# Patient Record
Sex: FEMALE | Race: WHITE | NOT HISPANIC OR LATINO | Employment: PART TIME | URBAN - METROPOLITAN AREA
[De-identification: names, ages, dates, MRNs, and addresses within clinical notes are randomized per-mention and may not be internally consistent; named-entity substitution may affect disease eponyms.]

---

## 2023-03-21 ENCOUNTER — OFFICE VISIT (OUTPATIENT)
Dept: FAMILY MEDICINE CLINIC | Facility: CLINIC | Age: 63
End: 2023-03-21

## 2023-03-21 VITALS
HEIGHT: 62 IN | WEIGHT: 213 LBS | SYSTOLIC BLOOD PRESSURE: 140 MMHG | TEMPERATURE: 97.8 F | DIASTOLIC BLOOD PRESSURE: 80 MMHG | BODY MASS INDEX: 39.2 KG/M2 | HEART RATE: 85 BPM | OXYGEN SATURATION: 97 %

## 2023-03-21 DIAGNOSIS — Z00.00 ANNUAL PHYSICAL EXAM: Primary | ICD-10-CM

## 2023-03-21 DIAGNOSIS — Z12.11 COLON CANCER SCREENING: ICD-10-CM

## 2023-03-21 DIAGNOSIS — Z13.220 LIPID SCREENING: ICD-10-CM

## 2023-03-21 PROBLEM — Q27.9 VENOUS ANOMALY: Status: ACTIVE | Noted: 2023-03-21

## 2023-03-21 NOTE — PROGRESS NOTES
Assessment/Plan:       1  Annual physical exam  Assessment & Plan:  I have reviewed the nurse practitioner's note and agree with the workup and plan  Check labs    Orders:  -     CBC and differential; Future  -     Comprehensive metabolic panel; Future    2  Colon cancer screening  Comments:  Fit test ordered  Orders:  -     Occult Blood, Fecal Immunochemical; Future    3  Lipid screening  -     CBC and differential; Future  -     Comprehensive metabolic panel; Future  -     Lipid panel; Future        Subjective:      Patient ID: Sarina Dozier is a 65 y o  female  [facility]  No [facility]  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  No chest pain or sob  Sh eunderstands the importance of weight loss/diet/exercise  She needs labs  She is on no routine meds  She does not smoke or drink  Sh ehad a mammogram   She opts for a FIT test       The following portions of the patient's history were reviewed and updated as appropriate: allergies, current medications, past family history, past medical history, past social history, past surgical history, and problem list     Review of Systems   Respiratory: Negative  Cardiovascular: Negative  Gastrointestinal: Negative  Objective:      /80   Pulse 85   Temp 97 8 °F (36 6 °C)   Ht 5' 2" (1 575 m)   Wt 96 6 kg (213 lb)   SpO2 97%   BMI 38 96 kg/m²          Physical Exam  Vitals and nursing note reviewed  Constitutional:       Appearance: Normal appearance  HENT:      Head: Normocephalic and atraumatic  Nose: Nose normal       Mouth/Throat:      Mouth: Mucous membranes are moist    Eyes:      Extraocular Movements: Extraocular movements intact  Pupils: Pupils are equal, round, and reactive to light  Cardiovascular:      Rate and Rhythm: Normal rate and regular rhythm  Pulses: Normal pulses  Pulmonary:      Effort: Pulmonary effort is normal       Breath sounds: Normal breath sounds     Abdominal: General: Bowel sounds are normal       Palpations: Abdomen is soft  Musculoskeletal:      Cervical back: Normal range of motion  Skin:     General: Skin is warm and dry  Capillary Refill: Capillary refill takes less than 2 seconds  Neurological:      General: No focal deficit present  Mental Status: She is alert     Psychiatric:         Mood and Affect: Mood normal

## 2023-05-22 ENCOUNTER — TELEPHONE (OUTPATIENT)
Dept: FAMILY MEDICINE CLINIC | Facility: CLINIC | Age: 63
End: 2023-05-22

## 2023-05-22 NOTE — LETTER
May 23, 2023     Patient: Skip Bronson  YOB: 1960  Date of Visit: 5/22/2023      To Whom it May Concern:    Skip Bronson is under my professional care  Wei Coker was seen in my office on 5/22/2023  Wei Coker may return to work on 5/29/23  please excuse 5/22-5/26    If you have any questions or concerns, please don't hesitate to call           Sincerely,          Boubacar Powell MD        CC: No Recipients

## 2023-05-22 NOTE — TELEPHONE ENCOUNTER
Patient called she is requesting a Dr  Note for this week of work, she as given a work note back in 2019 for the same reason  And she is asking if it is possible for you to write another one for this week   Please advise

## 2023-05-22 NOTE — LETTER
May 23, 2023     Patient: Maria Esther Michelle  YOB: 1960  Date of Visit: 5/22/2023      To Whom it May Concern:    Maria Esther Michelle is under my professional care  Pineda Loera was seen in my office on 5/22/2023  Pineda Loera may return to work on 5/29/23  please excuse 5/22-5/26    If you have any questions or concerns, please don't hesitate to call           Sincerely,          Yfn Campbell [place]        CC: No Recipients

## 2023-05-23 NOTE — TELEPHONE ENCOUNTER
Patient's employer called regarding the letter  It is incorrect as it states that patient can be dismissed from gym class and sports  Patient is also asking for the DATE OF VISIT to be dated 05/22/2023  It needs to be redone and then signed and faxed to [phone]

## 2024-03-27 ENCOUNTER — OFFICE VISIT (OUTPATIENT)
Dept: FAMILY MEDICINE CLINIC | Facility: CLINIC | Age: 64
End: 2024-03-27
Payer: COMMERCIAL

## 2024-03-27 VITALS
OXYGEN SATURATION: 98 % | HEIGHT: 62 IN | HEART RATE: 97 BPM | DIASTOLIC BLOOD PRESSURE: 82 MMHG | WEIGHT: 196.4 LBS | SYSTOLIC BLOOD PRESSURE: 122 MMHG | BODY MASS INDEX: 36.14 KG/M2

## 2024-03-27 DIAGNOSIS — Z12.4 SCREENING FOR CERVICAL CANCER: ICD-10-CM

## 2024-03-27 DIAGNOSIS — Z00.00 ANNUAL PHYSICAL EXAM: Primary | ICD-10-CM

## 2024-03-27 DIAGNOSIS — Q27.9 VENOUS ANOMALY: ICD-10-CM

## 2024-03-27 PROCEDURE — 99396 PREV VISIT EST AGE 40-64: CPT | Performed by: FAMILY MEDICINE

## 2024-03-27 NOTE — PROGRESS NOTES
"Assessment/Plan:       1. Annual physical exam  Assessment & Plan:  Reviewed age-appropriate health maintenance and preventive care.      Orders:  -     CBC and Platelet; Future  -     Comprehensive metabolic panel; Future  -     Lipid Panel With Direct LDL; Future  -     Occult Blood, Fecal Immunochemical; Future  -     CBC and Platelet  -     Comprehensive metabolic panel  -     Lipid Panel With Direct LDL    2. Screening for cervical cancer  -     Occult Blood, Fecal Immunochemical; Future    3. Venous anomaly          Subjective:      Patient ID: Evelina Ford is a 63 y.o. female.    Ranjana is doing great.  No complaints/concerns.  No chest pain or sob.  No falls or depression.  Labs ordered.  Colon screening ordered.  She has lost weight.  Discussed importance of diet/exercise/weight loss.        The following portions of the patient's history were reviewed and updated as appropriate: allergies, current medications, past family history, past medical history, past social history, past surgical history, and problem list.    Review of Systems   Respiratory: Negative.     Cardiovascular: Negative.          Objective:      /82   Pulse 97   Ht 5' 2\" (1.575 m)   Wt 89.1 kg (196 lb 6.4 oz)   SpO2 98%   BMI 35.92 kg/m²          Physical Exam  Vitals and nursing note reviewed.   Constitutional:       Appearance: Normal appearance.   HENT:      Head: Normocephalic and atraumatic.      Nose: Nose normal.      Mouth/Throat:      Mouth: Mucous membranes are moist.   Eyes:      Extraocular Movements: Extraocular movements intact.      Pupils: Pupils are equal, round, and reactive to light.   Cardiovascular:      Rate and Rhythm: Normal rate and regular rhythm.      Pulses: Normal pulses.   Pulmonary:      Effort: Pulmonary effort is normal.      Breath sounds: Normal breath sounds.   Abdominal:      General: Bowel sounds are normal.      Palpations: Abdomen is soft.   Musculoskeletal:      Cervical back: Normal range " of motion.   Skin:     General: Skin is warm and dry.      Capillary Refill: Capillary refill takes less than 2 seconds.   Neurological:      General: No focal deficit present.      Mental Status: She is alert.   Psychiatric:         Mood and Affect: Mood normal.

## 2024-05-17 LAB
ALBUMIN SERPL-MCNC: 4.2 G/DL (ref 3.5–5.7)
ALP SERPL-CCNC: 71 U/L (ref 35–120)
ALT SERPL-CCNC: 12 U/L
ANION GAP SERPL CALCULATED.3IONS-SCNC: 9 MMOL/L (ref 3–11)
AST SERPL-CCNC: 11 U/L
BASOPHILS # BLD AUTO: 0 THOU/CMM (ref 0–0.1)
BASOPHILS NFR BLD AUTO: 1 %
BILIRUB SERPL-MCNC: 0.5 MG/DL (ref 0.2–1)
BUN SERPL-MCNC: 14 MG/DL (ref 7–25)
CALCIUM SERPL-MCNC: 9.2 MG/DL (ref 8.5–10.1)
CHLORIDE SERPL-SCNC: 103 MMOL/L (ref 100–109)
CHOLEST SERPL-MCNC: 254 MG/DL
CHOLEST/HDLC SERPL: 4.2 {RATIO}
CO2 SERPL-SCNC: 29 MMOL/L (ref 21–31)
CREAT SERPL-MCNC: 0.82 MG/DL (ref 0.4–1.1)
CYTOLOGY CMNT CVX/VAG CYTO-IMP: ABNORMAL
DIFFERENTIAL METHOD BLD: NORMAL
EOSINOPHIL # BLD AUTO: 0.1 THOU/CMM (ref 0–0.5)
EOSINOPHIL NFR BLD AUTO: 2 %
ERYTHROCYTE [DISTWIDTH] IN BLOOD BY AUTOMATED COUNT: 13.8 % (ref 12–16)
GFR/BSA.PRED SERPLBLD CYS-BASED-ARV: 81 ML/MIN/{1.73_M2}
GLUCOSE SERPL-MCNC: 131 MG/DL (ref 65–99)
HCT VFR BLD AUTO: 38.5 % (ref 35–43)
HDLC SERPL-MCNC: 61 MG/DL (ref 23–92)
HGB BLD-MCNC: 12.7 G/DL (ref 11.5–14.5)
LDLC SERPL CALC-MCNC: 154 MG/DL
LDLC SERPL DIRECT ASSAY-MCNC: 171 MG/DL
LYMPHOCYTES # BLD AUTO: 2.1 THOU/CMM (ref 1–3)
LYMPHOCYTES NFR BLD AUTO: 34 %
MCH RBC QN AUTO: 29.3 PG (ref 26–34)
MCHC RBC AUTO-ENTMCNC: 33.1 G/DL (ref 32–37)
MCV RBC AUTO: 89 FL (ref 80–100)
MONOCYTES # BLD AUTO: 0.5 THOU/CMM (ref 0.3–1)
MONOCYTES NFR BLD AUTO: 8 %
NEUTROPHILS # BLD AUTO: 3.4 THOU/CMM (ref 1.8–7.8)
NEUTROPHILS NFR BLD AUTO: 55 %
NONHDLC SERPL-MCNC: 193 MG/DL
PLATELET # BLD AUTO: 242 THOU/CMM (ref 140–350)
PMV BLD REES-ECKER: 8.2 FL (ref 7.5–11.3)
POTASSIUM SERPL-SCNC: 4 MMOL/L (ref 3.5–5.2)
PROT SERPL-MCNC: 6.8 G/DL (ref 6.3–8.3)
RBC # BLD AUTO: 4.34 MILL/CMM (ref 3.7–4.7)
SODIUM SERPL-SCNC: 141 MMOL/L (ref 135–145)
TRIGL SERPL-MCNC: 196 MG/DL
WBC # BLD AUTO: 6.1 THOU/CMM (ref 4–10)

## 2024-05-20 ENCOUNTER — TELEPHONE (OUTPATIENT)
Dept: FAMILY MEDICINE CLINIC | Facility: CLINIC | Age: 64
End: 2024-05-20

## 2024-05-20 ENCOUNTER — DOCUMENTATION (OUTPATIENT)
Dept: FAMILY MEDICINE CLINIC | Facility: CLINIC | Age: 64
End: 2024-05-20

## 2024-05-20 DIAGNOSIS — R73.01 IMPAIRED FASTING GLUCOSE: Primary | ICD-10-CM

## 2024-05-20 LAB — HEMOCCULT STL QL IA: NEGATIVE

## 2024-05-20 NOTE — TELEPHONE ENCOUNTER
----- Message from Robert Budinetz, MD sent at 5/19/2024  6:59 AM EDT -----  Cholesterol elevated  Glucose slightly elevated. A1c ordered  Diet/exercise/weight loss

## 2024-05-22 ENCOUNTER — TELEPHONE (OUTPATIENT)
Dept: FAMILY MEDICINE CLINIC | Facility: CLINIC | Age: 64
End: 2024-05-22

## 2024-05-22 NOTE — TELEPHONE ENCOUNTER
----- Message from Robert Budinetz, MD sent at 5/20/2024  7:59 PM EDT -----  Stool negative for blood

## 2025-04-09 ENCOUNTER — OFFICE VISIT (OUTPATIENT)
Dept: OBGYN CLINIC | Facility: CLINIC | Age: 65
End: 2025-04-09
Payer: COMMERCIAL

## 2025-04-09 VITALS
HEIGHT: 62 IN | WEIGHT: 198.4 LBS | BODY MASS INDEX: 36.51 KG/M2 | DIASTOLIC BLOOD PRESSURE: 80 MMHG | SYSTOLIC BLOOD PRESSURE: 130 MMHG

## 2025-04-09 DIAGNOSIS — N95.0 POSTMENOPAUSAL BLEEDING: Primary | ICD-10-CM

## 2025-04-09 PROCEDURE — 99203 OFFICE O/P NEW LOW 30 MIN: CPT | Performed by: PHYSICIAN ASSISTANT

## 2025-04-09 NOTE — PROGRESS NOTES
ASSESSMENT/PLAN:    Encounter Diagnosis     ICD-10-CM    1. Postmenopausal bleeding  N95.0 US pelvis complete w transvaginal        - Reviewed concerns regarding postmenopausal bleeding with patient including concern and need to r/o uterine cancer.   - Offered patient uterine biopsy today, but pt declines exam and biopsy today  - TVUS ordered and discussed this process with patient, will evaluate uterine lining, lining should be thin in postmenopausal patients  - Discussed that regardless of US findings, would recommend biopsy of uterus for patient given the chronic nature of her bleeding as it has continued daily for 3 months. Patient desires to proceed with biopsy/D&C in the operating room as she does not want to do a procedure in the office  - Will reach out to patient with US results.       SUBJECTIVE/HPI:      Patient ID: Evelina Ford 1960       Evelina Ford is a 64 y.o.  presenting to the office as a new patient with postmenopausal bleeding x 3 months. Patient had pink spotting that started 3 months ago. It was initially when wiping, but now is a light streak on her pad daily. She denies heavy bleeding. She has been postmenopausal for approx 10 years. She is not diabetic. She has a hx of 3 vaginal deliveries.       HISTORY:    Patient Active Problem List   Diagnosis    Venous anomaly       Allergies   Allergen Reactions    Aspirin Other (See Comments)     Avoids ASA due to venous anomaly       No current outpatient medications on file.    OB History          3    Para   3    Term   3            AB        Living   3         SAB        IAB        Ectopic        Multiple        Live Births   3           Obstetric Comments   Menarche 17                Past Medical History:   Diagnosis Date    No pertinent past medical history         Past Surgical History:   Procedure Laterality Date    HERNIA REPAIR      TUBAL LIGATION          Family History   Problem Relation Age of Onset    Diabetes  "Mother     Breast cancer Neg Hx     Colon cancer Neg Hx     Ovarian cancer Neg Hx         REVIEW OF SYSTEMS:  Review of Systems   Genitourinary:  Positive for vaginal bleeding. Negative for pelvic pain and vaginal pain.        OBJECTIVE:    Visit Vitals  /80 (BP Location: Left arm, Patient Position: Sitting, Cuff Size: Standard)   Ht 5' 2\" (1.575 m)   Wt 90 kg (198 lb 6.4 oz)   BMI 36.29 kg/m²   OB Status Postmenopausal   Smoking Status Never   BSA 1.91 m²         Physical Exam  Constitutional:       General: She is not in acute distress.     Appearance: Normal appearance. She is obese. She is not ill-appearing, toxic-appearing or diaphoretic.   HENT:      Head: Normocephalic and atraumatic.   Pulmonary:      Effort: Pulmonary effort is normal.   Neurological:      General: No focal deficit present.      Mental Status: She is alert.   Skin:     General: Skin is warm and dry.   Psychiatric:         Mood and Affect: Mood normal.         Behavior: Behavior normal.   Vitals reviewed.         "

## 2025-04-16 ENCOUNTER — OFFICE VISIT (OUTPATIENT)
Dept: FAMILY MEDICINE CLINIC | Facility: CLINIC | Age: 65
End: 2025-04-16
Payer: COMMERCIAL

## 2025-04-16 ENCOUNTER — APPOINTMENT (OUTPATIENT)
Dept: LAB | Facility: CLINIC | Age: 65
End: 2025-04-16

## 2025-04-16 VITALS
HEIGHT: 61 IN | BODY MASS INDEX: 36.63 KG/M2 | HEART RATE: 94 BPM | WEIGHT: 194 LBS | TEMPERATURE: 97.8 F | SYSTOLIC BLOOD PRESSURE: 118 MMHG | DIASTOLIC BLOOD PRESSURE: 74 MMHG | OXYGEN SATURATION: 99 %

## 2025-04-16 DIAGNOSIS — Z12.11 COLON CANCER SCREENING: ICD-10-CM

## 2025-04-16 DIAGNOSIS — Z12.4 SCREENING FOR CERVICAL CANCER: ICD-10-CM

## 2025-04-16 DIAGNOSIS — Q27.9 VENOUS ANOMALY: ICD-10-CM

## 2025-04-16 DIAGNOSIS — Z12.31 ENCOUNTER FOR SCREENING MAMMOGRAM FOR BREAST CANCER: ICD-10-CM

## 2025-04-16 DIAGNOSIS — Z00.00 ANNUAL PHYSICAL EXAM: Primary | ICD-10-CM

## 2025-04-16 PROCEDURE — 99396 PREV VISIT EST AGE 40-64: CPT | Performed by: FAMILY MEDICINE

## 2025-04-16 NOTE — PATIENT INSTRUCTIONS
"Patient Education     Routine physical for adults   The Basics   Written by the doctors and editors at [facility]   What is a physical? -- A physical is a routine visit, or \"check-up,\" with your doctor. You might also hear it called a \"wellness visit\" or \"preventive visit.\"  During each visit, the doctor will:   Ask about your physical and mental health   Ask about your habits, behaviors, and lifestyle   Do an exam   Give you vaccines if needed   Talk to you about any medicines you take   Give advice about your health   Answer your questions  Getting regular check-ups is an important part of taking care of your health. It can help your doctor find and treat any problems you have. But it's also important for preventing health problems.  A routine physical is different from a \"sick visit.\" A sick visit is when you see a doctor because of a health concern or problem. Since physicals are scheduled ahead of time, you can think about what you want to ask the doctor.  How often should I get a physical? -- It depends on your age and health. In general, for people age 21 years and older:   If you are younger than 50 years, you might be able to get a physical every 3 years.   If you are 50 years or older, your doctor might recommend a physical every year.  If you have an ongoing health condition, like diabetes or high blood pressure, your doctor will probably want to see you more often.  What happens during a physical? -- In general, each visit will include:   Physical exam - The doctor or nurse will check your height, weight, heart rate, and blood pressure. They will also look at your eyes and ears. They will ask about how you are feeling and whether you have any symptoms that bother you.   Medicines - It's a good idea to bring a list of all the medicines you take to each doctor visit. Your doctor will talk to you about your medicines and answer any questions. Tell them if you are having any side effects that bother you. You " "should also tell them if you are having trouble paying for any of your medicines.   Habits and behaviors - This includes:   Your diet   Your exercise habits   Whether you smoke, drink alcohol, or use drugs   Whether you are sexually active   Whether you feel safe at home  Your doctor will talk to you about things you can do to improve your health and lower your risk of health problems. They will also offer help and support. For example, if you want to quit smoking, they can give you advice and might prescribe medicines. If you want to improve your diet or get more physical activity, they can help you with this, too.   Lab tests, if needed - The tests you get will depend on your age and situation. For example, your doctor might want to check your:   Cholesterol   Blood sugar   Iron level   Vaccines - The recommended vaccines will depend on your age, health, and what vaccines you already had. Vaccines are very important because they can prevent certain serious or deadly infections.   Discussion of screening - \"Screening\" means checking for diseases or other health problems before they cause symptoms. Your doctor can recommend screening based on your age, risk, and preferences. This might include tests to check for:   Cancer, such as breast, prostate, cervical, ovarian, colorectal, prostate, lung, or skin cancer   Sexually transmitted infections, such as chlamydia and gonorrhea   Mental health conditions like depression and anxiety  Your doctor will talk to you about the different types of screening tests. They can help you decide which screenings to have. They can also explain what the results might mean.   Answering questions - The physical is a good time to ask the doctor or nurse questions about your health. If needed, they can refer you to other doctors or specialists, too.  Adults older than 65 years often need other care, too. As you get older, your doctor will talk to you about:   How to prevent falling at " home   Hearing or vision tests   Memory testing   How to take your medicines safely   Making sure that you have the help and support you need at home  All topics are updated as new evidence becomes available and our peer review process is complete.  This topic retrieved from [employer] on: May 02, 2024.  Topic 517877 Version 1.0  Release: 32.4.3 - C32.122  © 2024 UpToDate, Inc. and/or its affiliates. All rights reserved.  Consumer Information Use and Disclaimer   Disclaimer: This generalized information is a limited summary of diagnosis, treatment, and/or medication information. It is not meant to be comprehensive and should be used as a tool to help the user understand and/or assess potential diagnostic and treatment options. It does NOT include all information about conditions, treatments, medications, side effects, or risks that may apply to a specific patient. It is not intended to be medical advice or a substitute for the medical advice, diagnosis, or treatment of a health care provider based on the health care provider's examination and assessment of a patient's specific and unique circumstances. Patients must speak with a health care provider for complete information about their health, medical questions, and treatment options, including any risks or benefits regarding use of medications. This information does not endorse any treatments or medications as safe, effective, or approved for treating a specific patient. UpToDate, Inc. and its affiliates disclaim any warranty or liability relating to this information or the use thereof.The use of this information is governed by the Terms of Use, available at https://www.woltersexcentosuwer.com/en/know/clinical-effectiveness-terms. 2024© UpToDate, Inc. and its affiliates and/or licensors. All rights reserved.  Copyright   © 2024 UpToDate, Inc. and/or its affiliates. All rights reserved.

## 2025-04-16 NOTE — PROGRESS NOTES
Adult Annual Physical  Name: Evelina Ford      : 1960      MRN: [id number]  Encounter Provider: Robert Budinetz, MD  Encounter Date: 2025   Encounter department: [facility]    :  Assessment & Plan  Annual physical exam  Reviewed age-appropriate health maintenance and preventive care.         Encounter for screening mammogram for breast cancer         Screening for cervical cancer         Colon cancer screening    Orders:  •  iFOBT/FIT; Future    Venous anomaly             Preventive Screenings:  - Diabetes Screening: screening up-to-date  - Breast cancer screening: screening up-to-date   - Colon cancer screening: screening up-to-date   - Lung cancer screening: screening not indicated     Immunizations:  - Immunizations due: Influenza and Zoster (Shingrix)      Depression Screening and Follow-up Plan: Patient was screened for depression during today's encounter. They screened negative with a PHQ-2 score of 0.        History of Present Illness     Adult Annual Physical:  Patient presents for annual physical. Ranjana is doing well.  Labs ordered.  She agrees to a FIT colon screen.  Discussed diet/exercise/weight loss.  Mammogram was wnl.  No meds.  BP is ok.  She has a pelvic u/s scheduled.  She does not drink/smoke..     Diet and Physical Activity:  - Diet/Nutrition: no special diet.  - Exercise: no formal exercise.    Depression Screening:  - PHQ-2 Score: 0    General Health:  - Sleep: sleeps well.  - Hearing: normal hearing bilateral ears.  - Vision: no vision problems, most recent eye exam > 1 year ago and wears glasses.  - Dental: regular dental visits and brushes teeth once daily.    /GYN Health:    - Menopause: postmenopausal.   - History of STDs: no  - Contraception: menopause.      Advanced Care Planning:  - Has an advanced directive?: yes    - Has a durable medical POA?: yes      Review of Systems   Respiratory: Negative.     Cardiovascular: Negative.          Objective   BP  "118/74   Pulse 94   Temp 97.8 °F (36.6 °C) (Temporal)   Ht 5' 1\" (1.549 m)   Wt 88 kg (194 lb)   SpO2 99%   BMI 36.66 kg/m²     Physical Exam  Vitals and nursing note reviewed.   Constitutional:       General: She is not in acute distress.     Appearance: She is well-developed.   HENT:      Head: Normocephalic and atraumatic.   Eyes:      Conjunctiva/sclera: Conjunctivae normal.   Cardiovascular:      Rate and Rhythm: Normal rate and regular rhythm.      Heart sounds: No murmur heard.  Pulmonary:      Effort: Pulmonary effort is normal. No respiratory distress.      Breath sounds: Normal breath sounds.   Abdominal:      Palpations: Abdomen is soft.      Tenderness: There is no abdominal tenderness.   Musculoskeletal:         General: No swelling.      Cervical back: Neck supple.   Skin:     General: Skin is warm and dry.      Capillary Refill: Capillary refill takes less than 2 seconds.   Neurological:      Mental Status: She is alert.   Psychiatric:         Mood and Affect: Mood normal.       "

## 2025-04-22 ENCOUNTER — HOSPITAL ENCOUNTER (OUTPATIENT)
Dept: ULTRASOUND IMAGING | Facility: HOSPITAL | Age: 65
Discharge: HOME | End: 2025-04-22
Attending: PHYSICIAN ASSISTANT
Payer: COMMERCIAL

## 2025-04-22 ENCOUNTER — RESULTS FOLLOW-UP (OUTPATIENT)
Dept: FAMILY MEDICINE CLINIC | Facility: CLINIC | Age: 65
End: 2025-04-22

## 2025-04-22 ENCOUNTER — RESULTS FOLLOW-UP (OUTPATIENT)
Dept: OBGYN CLINIC | Facility: CLINIC | Age: 65
End: 2025-04-22

## 2025-04-22 ENCOUNTER — APPOINTMENT (OUTPATIENT)
Dept: LAB | Facility: CLINIC | Age: 65
End: 2025-04-22
Payer: COMMERCIAL

## 2025-04-22 DIAGNOSIS — N95.0 POSTMENOPAUSAL BLEEDING: ICD-10-CM

## 2025-04-22 DIAGNOSIS — Z12.11 COLON CANCER SCREENING: ICD-10-CM

## 2025-04-22 LAB — HEMOCCULT STL QL IA: NEGATIVE

## 2025-04-22 PROCEDURE — 76856 US EXAM PELVIC COMPLETE: CPT

## 2025-04-22 PROCEDURE — G0328 FECAL BLOOD SCRN IMMUNOASSAY: HCPCS

## 2025-04-22 PROCEDURE — 76830 TRANSVAGINAL US NON-OB: CPT

## 2025-05-06 ENCOUNTER — TELEPHONE (OUTPATIENT)
Dept: OBGYN CLINIC | Facility: CLINIC | Age: 65
End: 2025-05-06

## 2025-05-06 ENCOUNTER — TELEPHONE (OUTPATIENT)
Age: 65
End: 2025-05-06

## 2025-05-06 ENCOUNTER — PREP FOR PROCEDURE (OUTPATIENT)
Dept: OBGYN CLINIC | Facility: CLINIC | Age: 65
End: 2025-05-06

## 2025-05-06 DIAGNOSIS — N95.0 PMB (POSTMENOPAUSAL BLEEDING): Primary | ICD-10-CM

## 2025-05-06 DIAGNOSIS — R93.89 THICKENED ENDOMETRIUM: ICD-10-CM

## 2025-05-06 RX ORDER — SODIUM CHLORIDE, SODIUM LACTATE, POTASSIUM CHLORIDE, CALCIUM CHLORIDE 600; 310; 30; 20 MG/100ML; MG/100ML; MG/100ML; MG/100ML
20 INJECTION, SOLUTION INTRAVENOUS CONTINUOUS
OUTPATIENT
Start: 2025-05-06

## 2025-05-06 NOTE — TELEPHONE ENCOUNTER
----- Message sent at 2025  1:36 PM EDT -----  Regarding: needs surgery per Amber    .[facility] GYN Department  Surgery Scheduling Sheet    Patient Name: Evelina Ford  : 1960    Provider: Dr. Ana Florez / Amber Montez PA-C     Needed: no; Language: N/A    Procedure: exam under anesthesia and dilation and curettage, hysteroscopy    Diagnosis: PMB, thickened endometrium    Special Needs or Equipment: symphion    Anesthesia: IV sedation with anesthesia    Length of stay: outpatient  Does patient have comorbid conditions that will require close perioperative monitoring prior to safe discharge: no    -The patient has comorbid conditions that will require close perioperative monitoring prior to safe discharge, including N/A.   -This may require acute care beyond the usual and routine recovery period. As such, inpatient admission post-operatively is expected and appropriate, and anticipated hospital length of stay will be >2 midnights.    Pre-Admission Testing Needed: no   Labs that should be ordered: NA    Order PAT that is recommended in prep for procedure?: Not Indicated    Medical Clearance Needed: no; Provider: N/A    MA Form Signed (tubals/hysterectomy): Not Indicated    Surgical Drink Given: no     How many days out of work: 1 day(s)     How many days no drivin day(s)       Is pre op appt needed?  yes  Interval for post op appt: 2 week(s)       For Surgical Scheduler:     Surgery Scheduled On: MON. 25-MORNING  [place]: [facility]    Pre-op Appt: 25  Post op Appt: 25  Consult/Medical clearance appt: N/A

## 2025-05-06 NOTE — TELEPHONE ENCOUNTER
.Called and spoke with pt.   Surgery is now scheduled.   Please see the [facility] OB GYN Department Surgery Scheduling Sheet in this encounter for further information.

## 2025-05-06 NOTE — TELEPHONE ENCOUNTER
Patient called to follow-up on surgery scheduling for D&C for PMB. Attempted to warm transfer to [facility], unavailable.     Please contact pt

## 2025-05-19 ENCOUNTER — ANESTHESIA EVENT (OUTPATIENT)
Dept: PERIOP | Facility: AMBULARY SURGERY CENTER | Age: 65
End: 2025-05-19
Payer: COMMERCIAL

## 2025-05-20 ENCOUNTER — CONSULT (OUTPATIENT)
Dept: OBGYN CLINIC | Facility: CLINIC | Age: 65
End: 2025-05-20
Payer: COMMERCIAL

## 2025-05-20 VITALS
DIASTOLIC BLOOD PRESSURE: 86 MMHG | SYSTOLIC BLOOD PRESSURE: 138 MMHG | BODY MASS INDEX: 36.06 KG/M2 | HEIGHT: 61 IN | WEIGHT: 191 LBS

## 2025-05-20 DIAGNOSIS — Z01.818 PRE-OP EXAMINATION: Primary | ICD-10-CM

## 2025-05-20 DIAGNOSIS — N95.0 PMB (POSTMENOPAUSAL BLEEDING): ICD-10-CM

## 2025-05-20 PROCEDURE — 99214 OFFICE O/P EST MOD 30 MIN: CPT | Performed by: OBSTETRICS & GYNECOLOGY

## 2025-05-20 NOTE — PROGRESS NOTES
Assessment Evelina was seen today for pre-op exam.    Diagnoses and all orders for this visit:    Pre-op examination    PMB (postmenopausal bleeding)         Plan    Evelina Ford is scheduled for exam under anesthesia, dilation and curettage , hysteroscopy, and diagnostic hysteroscopy & PAP on 2025. Pre-op instructions, including showering with Hibiclens, discussed with patient and patient received paper copy.     The risks, benefits and alternatives to the procedure were discussed.  We discussed the risks of pain, bleeding, blood clot, infection, allergic reaction, neurovascular injury, injury to uterus, injury to surrounding structures such as bowel, bladder and/or ureters, and possibility of inability to complete the procedure.  We discussed code status - full code.  Blood transfusion is acceptable.  We discussed resident physician participation in the procedure, including pelvic exam.  All questions answered, consent obtained.      Subjective  Chief Complaint   Patient presents with    Pre-op Exam     Pt is here for her pre op d+c           Evelina Ford is a 64 y.o.  female  here for a pre-op consultation. She started having discharge - there was light pink discharge, late February while she was in [place] x 1 episode. She's been having yellow discharge since then, and then had red blood yesterday, small amount on pad, then faint on wiping. No cramping.     We discussed evaluation with D&C, hysteroscopy, resection of uterine pathology and pap smear in OR. Patient is very concerned about modesty in the OR and wants to be covered up as much as possible.       Problem List[1]      Gynecologic History  No LMP recorded. Patient is postmenopausal.  H/o normal paps   Last mammogram: 2025. Results were: Cat 1     Obstetric History  OB History    Para Term  AB Living   3 3 3   3   SAB IAB Ectopic Multiple Live Births       3      # Outcome Date GA Lbr Darrell/2nd Weight Sex Type Anes PTL Lv   3  Term      Vag-Spont   TOR   2 Term      Vag-Spont   TOR   1 Term      Vag-Spont   TOR      Obstetric Comments   Menarche 17        Past Medical/Surgical/Family/Social History    Past Medical History:   Diagnosis Date    No pertinent past medical history      Past Surgical History:   Procedure Laterality Date    HERNIA REPAIR  1992    TUBAL LIGATION  1992     Family History   Problem Relation Age of Onset    Diabetes Mother     Breast cancer Neg Hx     Colon cancer Neg Hx     Ovarian cancer Neg Hx      Social History     Socioeconomic History    Marital status: /Civil Union     Spouse name: Not on file    Number of children: Not on file    Years of education: Not on file    Highest education level: Not on file   Occupational History    Not on file   Tobacco Use    Smoking status: Never    Smokeless tobacco: Never   Vaping Use    Vaping status: Never Used   Substance and Sexual Activity    Alcohol use: Never     Comment: rare    Drug use: Never     Comment: Denies    Sexual activity: Not Currently     Birth control/protection: Post-menopausal     Comment: Declined   Other Topics Concern    Not on file   Social History Narrative    Not on file     Social Drivers of Health     Financial Resource Strain: Not on file   Food Insecurity: Patient Declined (4/11/2025)    Nursing - Inadequate Food Risk Classification     Worried About Running Out of Food in the Last Year: Patient declined     Ran Out of Food in the Last Year: Patient declined     Ran Out of Food in the Last Year: Not on file   Transportation Needs: Patient Declined (4/11/2025)    PRAPARE - Transportation     Lack of Transportation (Medical): Patient declined     Lack of Transportation (Non-Medical): Patient declined   Physical Activity: Not on file   Stress: Not on file   Social Connections: Not on file   Intimate Partner Violence: Not on file   Housing Stability: Patient Declined (4/11/2025)    Housing Stability Vital Sign     Unable to Pay for Housing  "in the Last Year: Patient declined     Number of Times Moved in the Last Year: Not on file     Homeless in the Last Year: Patient declined         Aspirin  Current Medications[2]      Review of Systems  Review of Systems   Constitutional:  Negative for chills and fever.   Gastrointestinal:  Negative for blood in stool and constipation.   Genitourinary:  Positive for vaginal bleeding. Negative for dysuria, frequency and pelvic pain.              Objective     /86 (BP Location: Right arm, Patient Position: Sitting, Cuff Size: Large)   Ht 5' 1\" (1.549 m)   Wt 86.6 kg (191 lb)   BMI 36.09 kg/m²     Physical Exam  Constitutional:       General: She is not in acute distress.     Appearance: Normal appearance. She is well-developed. She is not ill-appearing.     Cardiovascular:      Rate and Rhythm: Normal rate and regular rhythm.   Pulmonary:      Effort: Pulmonary effort is normal. No respiratory distress.      Breath sounds: Normal breath sounds.   Abdominal:      General: Abdomen is flat. There is no distension.      Palpations: Abdomen is soft.      Tenderness: There is no abdominal tenderness. There is no guarding or rebound.     Musculoskeletal:      Cervical back: Normal range of motion.      Right lower leg: No edema.      Left lower leg: No edema.     Neurological:      General: No focal deficit present.      Mental Status: She is alert and oriented to person, place, and time.     Skin:     General: Skin is warm and dry.     Psychiatric:         Mood and Affect: Mood normal.         Behavior: Behavior normal.         Thought Content: Thought content normal.         Judgment: Judgment normal.   Vitals and nursing note reviewed.         US pelvis complete w transvaginal  Narrative: PELVIC ULTRASOUND, COMPLETE    INDICATION: The patient is 64 years old. N95.0: Postmenopausal bleeding.    COMPARISON: None    TECHNIQUE: Transabdominal pelvic ultrasound was performed in sagittal and transverse planes with a " curvilinear transducer. Additional transvaginal imaging was performed to better evaluate the endometrium and ovaries. Imaging included volumetric sweeps as   well as traditional still imaging technique.    FINDINGS:    UTERUS:  The uterus is anteverted in position, measuring 7.9 x 4.1 x 4.7 cm.  The uterus has a normal contour and echotexture.  The cervix appears within normal limits.    ENDOMETRIUM:  The endometrial echo complex has an AP caliber of 19.0 mm.  Endometrial echo complex appears abnormally thickened for patient age, and heterogeneous.    OVARIES/ADNEXA:  Right ovary: x x cm. mL.  Ovarian Doppler flow is within normal limits.  No suspicious ovarian or adnexal abnormality.    Left ovary: 1.9 x 1.5 x 1.1 cm. 1.6 mL.  Ovarian Doppler flow is within normal limits.  No suspicious ovarian or adnexal abnormality.    OTHER:  No free fluid or loculated fluid collections.  Impression: Thickened heterogeneous endometrium measuring 19 mm in this postmenopausal patient. Recommend endometrial biopsy    Workstation performed: [id number]                           [1]   Patient Active Problem List  Diagnosis    Venous anomaly   [2] No current outpatient medications on file.

## 2025-05-20 NOTE — H&P (VIEW-ONLY)
Assessment Evelina was seen today for pre-op exam.    Diagnoses and all orders for this visit:    Pre-op examination    PMB (postmenopausal bleeding)         Plan    Evelina Ford is scheduled for exam under anesthesia, dilation and curettage , hysteroscopy, and diagnostic hysteroscopy & PAP on 2025. Pre-op instructions, including showering with Hibiclens, discussed with patient and patient received paper copy.     The risks, benefits and alternatives to the procedure were discussed.  We discussed the risks of pain, bleeding, blood clot, infection, allergic reaction, neurovascular injury, injury to uterus, injury to surrounding structures such as bowel, bladder and/or ureters, and possibility of inability to complete the procedure.  We discussed code status - full code.  Blood transfusion is acceptable.  We discussed resident physician participation in the procedure, including pelvic exam.  All questions answered, consent obtained.      Subjective  Chief Complaint   Patient presents with    Pre-op Exam     Pt is here for her pre op d+c           Evelina Ford is a 64 y.o.  female  here for a pre-op consultation. She started having discharge - there was light pink discharge, late February while she was in [place] x 1 episode. She's been having yellow discharge since then, and then had red blood yesterday, small amount on pad, then faint on wiping. No cramping.     We discussed evaluation with D&C, hysteroscopy, resection of uterine pathology and pap smear in OR. Patient is very concerned about modesty in the OR and wants to be covered up as much as possible.       Problem List[1]      Gynecologic History  No LMP recorded. Patient is postmenopausal.  H/o normal paps   Last mammogram: 2025. Results were: Cat 1     Obstetric History  OB History    Para Term  AB Living   3 3 3   3   SAB IAB Ectopic Multiple Live Births       3      # Outcome Date GA Lbr Darrell/2nd Weight Sex Type Anes PTL Lv   3  Term      Vag-Spont   TOR   2 Term      Vag-Spont   TOR   1 Term      Vag-Spont   TOR      Obstetric Comments   Menarche 17        Past Medical/Surgical/Family/Social History    Past Medical History:   Diagnosis Date    No pertinent past medical history      Past Surgical History:   Procedure Laterality Date    HERNIA REPAIR  1992    TUBAL LIGATION  1992     Family History   Problem Relation Age of Onset    Diabetes Mother     Breast cancer Neg Hx     Colon cancer Neg Hx     Ovarian cancer Neg Hx      Social History     Socioeconomic History    Marital status: /Civil Union     Spouse name: Not on file    Number of children: Not on file    Years of education: Not on file    Highest education level: Not on file   Occupational History    Not on file   Tobacco Use    Smoking status: Never    Smokeless tobacco: Never   Vaping Use    Vaping status: Never Used   Substance and Sexual Activity    Alcohol use: Never     Comment: rare    Drug use: Never     Comment: Denies    Sexual activity: Not Currently     Birth control/protection: Post-menopausal     Comment: Declined   Other Topics Concern    Not on file   Social History Narrative    Not on file     Social Drivers of Health     Financial Resource Strain: Not on file   Food Insecurity: Patient Declined (4/11/2025)    Nursing - Inadequate Food Risk Classification     Worried About Running Out of Food in the Last Year: Patient declined     Ran Out of Food in the Last Year: Patient declined     Ran Out of Food in the Last Year: Not on file   Transportation Needs: Patient Declined (4/11/2025)    PRAPARE - Transportation     Lack of Transportation (Medical): Patient declined     Lack of Transportation (Non-Medical): Patient declined   Physical Activity: Not on file   Stress: Not on file   Social Connections: Not on file   Intimate Partner Violence: Not on file   Housing Stability: Patient Declined (4/11/2025)    Housing Stability Vital Sign     Unable to Pay for Housing  "in the Last Year: Patient declined     Number of Times Moved in the Last Year: Not on file     Homeless in the Last Year: Patient declined         Aspirin  Current Medications[2]      Review of Systems  Review of Systems   Constitutional:  Negative for chills and fever.   Gastrointestinal:  Negative for blood in stool and constipation.   Genitourinary:  Positive for vaginal bleeding. Negative for dysuria, frequency and pelvic pain.              Objective     /86 (BP Location: Right arm, Patient Position: Sitting, Cuff Size: Large)   Ht 5' 1\" (1.549 m)   Wt 86.6 kg (191 lb)   BMI 36.09 kg/m²     Physical Exam  Constitutional:       General: She is not in acute distress.     Appearance: Normal appearance. She is well-developed. She is not ill-appearing.     Cardiovascular:      Rate and Rhythm: Normal rate and regular rhythm.   Pulmonary:      Effort: Pulmonary effort is normal. No respiratory distress.      Breath sounds: Normal breath sounds.   Abdominal:      General: Abdomen is flat. There is no distension.      Palpations: Abdomen is soft.      Tenderness: There is no abdominal tenderness. There is no guarding or rebound.     Musculoskeletal:      Cervical back: Normal range of motion.      Right lower leg: No edema.      Left lower leg: No edema.     Neurological:      General: No focal deficit present.      Mental Status: She is alert and oriented to person, place, and time.     Skin:     General: Skin is warm and dry.     Psychiatric:         Mood and Affect: Mood normal.         Behavior: Behavior normal.         Thought Content: Thought content normal.         Judgment: Judgment normal.   Vitals and nursing note reviewed.         US pelvis complete w transvaginal  Narrative: PELVIC ULTRASOUND, COMPLETE    INDICATION: The patient is 64 years old. N95.0: Postmenopausal bleeding.    COMPARISON: None    TECHNIQUE: Transabdominal pelvic ultrasound was performed in sagittal and transverse planes with a " curvilinear transducer. Additional transvaginal imaging was performed to better evaluate the endometrium and ovaries. Imaging included volumetric sweeps as   well as traditional still imaging technique.    FINDINGS:    UTERUS:  The uterus is anteverted in position, measuring 7.9 x 4.1 x 4.7 cm.  The uterus has a normal contour and echotexture.  The cervix appears within normal limits.    ENDOMETRIUM:  The endometrial echo complex has an AP caliber of 19.0 mm.  Endometrial echo complex appears abnormally thickened for patient age, and heterogeneous.    OVARIES/ADNEXA:  Right ovary: x x cm. mL.  Ovarian Doppler flow is within normal limits.  No suspicious ovarian or adnexal abnormality.    Left ovary: 1.9 x 1.5 x 1.1 cm. 1.6 mL.  Ovarian Doppler flow is within normal limits.  No suspicious ovarian or adnexal abnormality.    OTHER:  No free fluid or loculated fluid collections.  Impression: Thickened heterogeneous endometrium measuring 19 mm in this postmenopausal patient. Recommend endometrial biopsy    Workstation performed: [id number]                           [1]   Patient Active Problem List  Diagnosis    Venous anomaly   [2] No current outpatient medications on file.

## 2025-05-22 RX ORDER — FLUOCINONIDE TOPICAL SOLUTION USP, 0.05% 0.5 MG/ML
SOLUTION TOPICAL
COMMUNITY
Start: 2025-04-23

## 2025-05-22 RX ORDER — FLUTICASONE PROPIONATE 50 MCG
1 SPRAY, SUSPENSION (ML) NASAL DAILY
COMMUNITY

## 2025-05-22 NOTE — PRE-PROCEDURE INSTRUCTIONS
Pre-Surgery Instructions:   Medication Instructions    fluocinonide (LIDEX) 0.05 % external solution Hold day of surgery.    fluticasone (FLONASE) 50 mcg/act nasal spray Uses PRN- OK to take day of surgery   Medication instructions for day of surgery reviewed. Please take all instructed medications with only a sip of water.       You will receive a call one business day prior to surgery with an arrival time and hospital directions. If your surgery is scheduled on a Monday, the hospital will be calling you on the Friday prior to your surgery. If you have not heard from anyone by 8pm, please call the hospital supervisor through the hospital  at [phone]. ([place] [phone] or [place] [phone]).    Do not eat or drink anything after midnight the night before your surgery, including candy, mints, lifesavers, or chewing gum. Do not drink alcohol 24hrs before your surgery. Try not to smoke at least 24hrs before your surgery.       Follow the pre surgery showering instructions as listed in the “My Surgical Experience Booklet” or otherwise provided by your surgeon's office. Do not use a blade to shave the surgical area 1 week before surgery. It is okay to use a clean electric clippers up to 24 hours before surgery. Do not apply any lotions, creams, including makeup, cologne, deodorant, or perfumes after showering on the day of your surgery. Do not use dry shampoo, hair spray, hair gel, or any type of hair products.     No contact lenses, eye make-up, or artificial eyelashes. Remove nail polish, including gel polish, and any artificial, gel, or acrylic nails if possible. Remove all jewelry including rings and body piercing jewelry.     Wear causal clothing that is easy to take on and off. Consider your type of surgery.    Keep any valuables, jewelry, piercings at home. Please bring any specially ordered equipment (sling, braces) if indicated.    Arrange for a responsible person to drive you to and from  the hospital on the day of your surgery. Please confirm the visitor policy for the day of your procedure when you receive your phone call with an arrival time.     Call the surgeon's office with any new illnesses, exposures, or additional questions prior to surgery.    Please reference your “My Surgical Experience Booklet” for additional information to prepare for your upcoming surgery.

## 2025-06-01 NOTE — ANESTHESIA PREPROCEDURE EVALUATION
"Procedure:  EXAM UNDER ANESTHESIA; DILATATION AND CURETTAGE (D&C) WITH HYSTEROSCOPY. RESECTION OF UTERINE PATHOLOGY. 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     Lab Results   Component Value Date    WBC 6.1 05/17/2024    HGB 12.7 05/17/2024    HCT 38.5 05/17/2024    MCV 89 05/17/2024     05/17/2024     Lab Results   Component Value Date    SODIUM 141 05/17/2024    K 4.0 05/17/2024     05/17/2024    CO2 29 05/17/2024    BUN 14 05/17/2024    CREATININE 0.82 05/17/2024    GLUC 131 (H) 05/17/2024    CALCIUM 9.2 05/17/2024     No results found for: \"INR\", \"PROTIME\"  No results found for: \"HGBA1C\"       Physical Exam    Airway     Mallampati score: II  TM Distance: >3 FB  Neck ROM: full      Cardiovascular  Cardiovascular exam normal    Dental   No notable dental hx     Pulmonary      Neurological  - normal exam    Other Findings  post-pubertal.      Anesthesia Plan  ASA Score- 2     Anesthesia Type- general with ASA Monitors.         Additional Monitors:     Airway Plan: LMA and LMA.    Comment: TIVA with propofol to minimize incidence of PONV    Discussed with patient plan for anesthesia, as well as risks/benefits, including likely chance of PONV and sore throat, as well as rare possibilities of dental/oropharyngeal/ocular injuries, aspiration, and severe/life-threatening surgical and anesthetic emergencies.  Patient expressed understanding and agreement.  .       Plan Factors-Exercise " tolerance (METS): >4 METS.    Chart reviewed.    Patient summary reviewed.                  Induction- intravenous.    Postoperative Plan- .   Monitoring Plan - Monitoring plan - standard ASA monitoring  Post Operative Pain Plan - multimodal analgesia        Informed Consent- Anesthetic plan and risks discussed with patient.  I personally reviewed this patient with the CRNA. Discussed and agreed on the Anesthesia Plan with the CRNA..      NPO Status:  No vitals data found for the desired time range.

## 2025-06-02 ENCOUNTER — TELEPHONE (OUTPATIENT)
Dept: FAMILY MEDICINE CLINIC | Facility: CLINIC | Age: 65
End: 2025-06-02

## 2025-06-02 ENCOUNTER — HOSPITAL ENCOUNTER (OUTPATIENT)
Facility: AMBULARY SURGERY CENTER | Age: 65
Setting detail: OUTPATIENT SURGERY
Discharge: HOME | End: 2025-06-02
Attending: OBSTETRICS & GYNECOLOGY | Admitting: OBSTETRICS & GYNECOLOGY
Payer: COMMERCIAL

## 2025-06-02 ENCOUNTER — APPOINTMENT (OUTPATIENT)
Dept: LAB | Facility: CLINIC | Age: 65
End: 2025-06-02
Payer: COMMERCIAL

## 2025-06-02 ENCOUNTER — ANESTHESIA (OUTPATIENT)
Dept: PERIOP | Facility: AMBULARY SURGERY CENTER | Age: 65
End: 2025-06-02
Payer: COMMERCIAL

## 2025-06-02 ENCOUNTER — TELEPHONE (OUTPATIENT)
Age: 65
End: 2025-06-02

## 2025-06-02 VITALS
SYSTOLIC BLOOD PRESSURE: 136 MMHG | DIASTOLIC BLOOD PRESSURE: 68 MMHG | RESPIRATION RATE: 18 BRPM | OXYGEN SATURATION: 94 % | HEIGHT: 61 IN | WEIGHT: 190 LBS | HEART RATE: 68 BPM | TEMPERATURE: 97 F | BODY MASS INDEX: 35.87 KG/M2

## 2025-06-02 DIAGNOSIS — N95.0 PMB (POSTMENOPAUSAL BLEEDING): ICD-10-CM

## 2025-06-02 DIAGNOSIS — R73.01 IMPAIRED FASTING GLUCOSE: Primary | ICD-10-CM

## 2025-06-02 DIAGNOSIS — R93.89 THICKENED ENDOMETRIUM: ICD-10-CM

## 2025-06-02 DIAGNOSIS — Z98.890 S/P DILATATION AND CURETTAGE: Primary | ICD-10-CM

## 2025-06-02 PROBLEM — N84.0 ENDOMETRIAL POLYP: Status: ACTIVE | Noted: 2025-06-02

## 2025-06-02 PROCEDURE — 88305 TISSUE EXAM BY PATHOLOGIST: CPT | Performed by: PATHOLOGY

## 2025-06-02 PROCEDURE — 88175 CYTOPATH C/V AUTO FLUID REDO: CPT | Performed by: OBSTETRICS & GYNECOLOGY

## 2025-06-02 PROCEDURE — 88360 TUMOR IMMUNOHISTOCHEM/MANUAL: CPT | Performed by: PATHOLOGY

## 2025-06-02 PROCEDURE — 58558 HYSTEROSCOPY BIOPSY: CPT | Performed by: OBSTETRICS & GYNECOLOGY

## 2025-06-02 PROCEDURE — 88342 IMHCHEM/IMCYTCHM 1ST ANTB: CPT | Performed by: PATHOLOGY

## 2025-06-02 PROCEDURE — 88341 IMHCHEM/IMCYTCHM EA ADD ANTB: CPT | Performed by: PATHOLOGY

## 2025-06-02 RX ORDER — SODIUM CHLORIDE, SODIUM LACTATE, POTASSIUM CHLORIDE, CALCIUM CHLORIDE 600; 310; 30; 20 MG/100ML; MG/100ML; MG/100ML; MG/100ML
20 INJECTION, SOLUTION INTRAVENOUS CONTINUOUS
Status: DISCONTINUED | OUTPATIENT
Start: 2025-06-02 | End: 2025-06-02 | Stop reason: HOSPADM

## 2025-06-02 RX ORDER — LIDOCAINE HYDROCHLORIDE 10 MG/ML
INJECTION, SOLUTION EPIDURAL; INFILTRATION; INTRACAUDAL; PERINEURAL AS NEEDED
Status: DISCONTINUED | OUTPATIENT
Start: 2025-06-02 | End: 2025-06-02

## 2025-06-02 RX ORDER — KETOROLAC TROMETHAMINE 30 MG/ML
INJECTION, SOLUTION INTRAMUSCULAR; INTRAVENOUS AS NEEDED
Status: DISCONTINUED | OUTPATIENT
Start: 2025-06-02 | End: 2025-06-02

## 2025-06-02 RX ORDER — FENTANYL CITRATE/PF 50 MCG/ML
25 SYRINGE (ML) INJECTION
Status: DISCONTINUED | OUTPATIENT
Start: 2025-06-02 | End: 2025-06-02 | Stop reason: HOSPADM

## 2025-06-02 RX ORDER — PROPOFOL 10 MG/ML
INJECTION, EMULSION INTRAVENOUS CONTINUOUS PRN
Status: DISCONTINUED | OUTPATIENT
Start: 2025-06-02 | End: 2025-06-02

## 2025-06-02 RX ORDER — FENTANYL CITRATE 50 UG/ML
INJECTION, SOLUTION INTRAMUSCULAR; INTRAVENOUS AS NEEDED
Status: DISCONTINUED | OUTPATIENT
Start: 2025-06-02 | End: 2025-06-02

## 2025-06-02 RX ORDER — ONDANSETRON 2 MG/ML
INJECTION INTRAMUSCULAR; INTRAVENOUS AS NEEDED
Status: DISCONTINUED | OUTPATIENT
Start: 2025-06-02 | End: 2025-06-02

## 2025-06-02 RX ORDER — IBUPROFEN 600 MG/1
600 TABLET, FILM COATED ORAL EVERY 6 HOURS PRN
Status: ON HOLD
Start: 2025-06-02 | End: 2025-06-19 | Stop reason: ALTCHOICE

## 2025-06-02 RX ORDER — PROPOFOL 10 MG/ML
INJECTION, EMULSION INTRAVENOUS AS NEEDED
Status: DISCONTINUED | OUTPATIENT
Start: 2025-06-02 | End: 2025-06-02

## 2025-06-02 RX ORDER — MIDAZOLAM HYDROCHLORIDE 2 MG/2ML
INJECTION, SOLUTION INTRAMUSCULAR; INTRAVENOUS AS NEEDED
Status: DISCONTINUED | OUTPATIENT
Start: 2025-06-02 | End: 2025-06-02

## 2025-06-02 RX ORDER — ACETAMINOPHEN 325 MG/1
975 TABLET ORAL EVERY 6 HOURS PRN
Status: DISCONTINUED | OUTPATIENT
Start: 2025-06-02 | End: 2025-06-02 | Stop reason: HOSPADM

## 2025-06-02 RX ORDER — SODIUM CHLORIDE, SODIUM LACTATE, POTASSIUM CHLORIDE, CALCIUM CHLORIDE 600; 310; 30; 20 MG/100ML; MG/100ML; MG/100ML; MG/100ML
INJECTION, SOLUTION INTRAVENOUS CONTINUOUS PRN
Status: DISCONTINUED | OUTPATIENT
Start: 2025-06-02 | End: 2025-06-02

## 2025-06-02 RX ORDER — DEXAMETHASONE SODIUM PHOSPHATE 10 MG/ML
INJECTION, SOLUTION INTRAMUSCULAR; INTRAVENOUS AS NEEDED
Status: DISCONTINUED | OUTPATIENT
Start: 2025-06-02 | End: 2025-06-02

## 2025-06-02 RX ORDER — DIPHENHYDRAMINE HYDROCHLORIDE 50 MG/ML
12.5 INJECTION, SOLUTION INTRAMUSCULAR; INTRAVENOUS ONCE AS NEEDED
Status: DISCONTINUED | OUTPATIENT
Start: 2025-06-02 | End: 2025-06-02 | Stop reason: HOSPADM

## 2025-06-02 RX ORDER — MAGNESIUM HYDROXIDE 1200 MG/15ML
LIQUID ORAL AS NEEDED
Status: DISCONTINUED | OUTPATIENT
Start: 2025-06-02 | End: 2025-06-02 | Stop reason: HOSPADM

## 2025-06-02 RX ORDER — ACETAMINOPHEN 325 MG/1
650 TABLET ORAL EVERY 6 HOURS PRN
Status: ON HOLD
Start: 2025-06-02 | End: 2025-06-19 | Stop reason: ALTCHOICE

## 2025-06-02 RX ADMIN — MIDAZOLAM HYDROCHLORIDE 2 MG: 1 INJECTION, SOLUTION INTRAMUSCULAR; INTRAVENOUS at 11:12

## 2025-06-02 RX ADMIN — SODIUM CHLORIDE, SODIUM LACTATE, POTASSIUM CHLORIDE, AND CALCIUM CHLORIDE: .6; .31; .03; .02 INJECTION, SOLUTION INTRAVENOUS at 10:35

## 2025-06-02 RX ADMIN — ONDANSETRON 4 MG: 2 INJECTION INTRAMUSCULAR; INTRAVENOUS at 11:12

## 2025-06-02 RX ADMIN — DEXAMETHASONE SODIUM PHOSPHATE 10 MG: 10 INJECTION INTRAMUSCULAR; INTRAVENOUS at 11:17

## 2025-06-02 RX ADMIN — DEXMEDETOMIDINE 4 MCG: 100 INJECTION, SOLUTION INTRAVENOUS at 11:28

## 2025-06-02 RX ADMIN — PROPOFOL 200 MG: 10 INJECTION, EMULSION INTRAVENOUS at 11:17

## 2025-06-02 RX ADMIN — DEXMEDETOMIDINE 12 MCG: 100 INJECTION, SOLUTION INTRAVENOUS at 11:17

## 2025-06-02 RX ADMIN — KETOROLAC TROMETHAMINE 15 MG: 30 INJECTION, SOLUTION INTRAMUSCULAR; INTRAVENOUS at 11:39

## 2025-06-02 RX ADMIN — LIDOCAINE HYDROCHLORIDE 50 MG: 10 INJECTION, SOLUTION EPIDURAL; INFILTRATION; INTRACAUDAL at 11:17

## 2025-06-02 RX ADMIN — FENTANYL CITRATE 50 MCG: 50 INJECTION INTRAMUSCULAR; INTRAVENOUS at 11:20

## 2025-06-02 RX ADMIN — PROPOFOL 100 MCG/KG/MIN: 10 INJECTION, EMULSION INTRAVENOUS at 11:17

## 2025-06-02 NOTE — OP NOTE
OPERATIVE REPORT  PATIENT NAME: Evelina Ford    :  1960  MRN: [id number]  Pt Location: [facility] OR ROOM 01    SURGERY DATE: 2025    Surgeons and Role:     * Ana Florez DO - Primary    Preop Diagnosis:  PMB (postmenopausal bleeding) [N95.0]  Thickened endometrium [R93.89]    Post-Op Diagnosis Codes:     * PMB (postmenopausal bleeding) [N95.0]     * Thickened endometrium [R93.89]    Procedure(s):  EXAM UNDER ANESTHESIA; DILATATION AND CURETTAGE (D&C) WITH HYSTEROSCOPY. RESECTION OF UTERINE PATHOLOGY.    Specimen(s):  ID Type Source Tests Collected by Time Destination   1 :  Thin-Prep Vial Cervix CONVENTIONAL PAP SMEAR, DIAGNOSTIC Ana Florez, DO 2025 11:18 AM    2 :  Thin-Prep Vial Cervix CONVENTIONAL PAP SMEAR, SCREENING Ana Florez, DO 2025 11:18 AM    3 : Endometrial Polyp Tissue Soft Tissue, Other TISSUE EXAM Ana Florez, DO 2025 11:36 AM    4 : [facility] Tissue Endometrium TISSUE EXAM Ana Florez, DO 2025 11:37 AM        Estimated Blood Loss:   Minimal    Drains:  none    Anesthesia Type:   IV Sedation with Anesthesia    Operative Indications:  PMB (postmenopausal bleeding) [N95.0]  Thickened endometrium [R93.89]      Operative Findings:  Large endometrial polyp filing the cavity.   Atrophic uterine walls    Fluid deficit 400ml    Complications:   None    Procedure and Technique:  Patient was taken to the operating room where she was properly identified and General LMA anesthesia (LMA) was administered without difficulty.  The patient was prepped and draped in the usual sterile fashion in the dorsal lithotomy position using the stirrups. Care was taken to avoid excessive flexion or extension of the hips and knees and pressure on the extremities. A time out was performed, and all were in agreement to proceed.      A weighted speculum was inserted into the vagina and a Valente retractor was used to visualize the anterior lip of the cervix, which  was then grasped with a single toothed tenaculum. A pap was collected. The uterus was sounded to 8cm. The cervix was serially dilated  for introduction of the hysteroscope.     Hysteroscope was introduced under direct visualization using normal saline solution as the distention media. Hysteroscope was advanced to the uterine fundus and the entire uterine cavity was inspected in a systematic manner. There was noted to be large polyp attached anteriorly. The resection device was passed through the os and used to remove the polyp under direct visualization. Hysteroscope was withdrawn and sharp curetting was performed, starting at the 12'oclock position and rotating a total of 360 degrees to cover all surfaces. Endometrial tissue was obtained and sent for pathology. The hysteroscope was removed.     All instruments were removed from the patient's vagina. There was no bleeding noted from the tenaculum site. The patient tolerated the procedure well. She was cleansed, awakened from anesthesia and taken to the recovery room in stable condition. Sponge, instrument and needle counts were correct x 2.  I was present for the entire procedure.            I was present for the entire procedure.    Patient Disposition:  PACU          SIGNATURE: Ana Florez DO  DATE: June 2, 2025  TIME: 11:40 AM

## 2025-06-02 NOTE — DISCHARGE INSTR - AVS FIRST PAGE
D&C (dialation and curettage), as well as hysteroscopy, are a common procedures performed   on many women each year. We strongly encourage you to review and adhere to the following   post operative instructions following your procedure.     After your surgery, please expect to feel tired or even sleepy. The anesthesia can make you feel drowsy for the first   24 hours. You should plan on lying down and stay home. You should be able to drink liquids and eat shortly after   the procedure.    You will experience cramping and bleeding for several days. You may take ibuprofen (600mg every 6 hours) or   acetaminophen (2 extra-strength tablets every 4 hours) for a day or so. You should feel much better by then.     Please refrain from douching, tampon use, tub baths or sexual intercourse for one week following this procedure or   you may risk developing an infection. Otherwise, you may resume your normal activity within a day or two.    Please return for your post op visit as scheduled. At that time, you will further discuss the findings and   pathology from the surgery if we have not already called you with the results. Also, we will discuss follow up plans.    Please call the office if you develop any of the following:   Temperature over 101 degrees   Severe, persistent abdominal pain   Heavy, persistent vaginal bleeding  These are symptoms require medical attention and should not be ignored

## 2025-06-02 NOTE — ANESTHESIA POSTPROCEDURE EVALUATION
Post-Op Assessment Note    CV Status:  Stable    Pain management: adequate       Mental Status:  Alert and awake   Hydration Status:  Euvolemic   PONV Controlled:  Controlled   Airway Patency:  Patent     Post Op Vitals Reviewed: Yes    No anethesia notable event occurred.    Staff: Anesthesiologist         Last Filed PACU Vitals:  Vitals Value Taken Time   Temp 97.2 °F (36.2 °C) 06/02/25 12:07   Pulse 72 06/02/25 12:10   /65 06/02/25 12:07   Resp 21 06/02/25 12:10   SpO2 91 % 06/02/25 12:10   Vitals shown include unfiled device data.    Modified Matthew:     Vitals Value Taken Time   Activity 2 06/02/25 12:07   Respiration 2 06/02/25 12:07   Circulation 1 06/02/25 12:07   Consciousness 2 06/02/25 12:07   Oxygen Saturation 2 06/02/25 12:07     Modified Matthew Score: 9

## 2025-06-02 NOTE — ANESTHESIA POSTPROCEDURE EVALUATION
Post-Op Assessment Note    CV Status:  Stable  Pain Score: 0    Pain management: adequate       Mental Status:  Sleepy and arousable   PONV Controlled:  None   Airway Patency:  Patent     Post Op Vitals Reviewed: Yes    No anethesia notable event occurred.    Staff: CRNA           Last Filed PACU Vitals:  Vitals Value Taken Time   Temp 97.2    Pulse 66 06/02/25 11:51   BP 99/54 06/02/25 11:51   Resp 18 06/02/25 11:51   SpO2 95 % 06/02/25 11:51   Vitals shown include unfiled device data.

## 2025-06-02 NOTE — INTERVAL H&P NOTE
H&P reviewed. After examining the patient I find no changes in the patients condition since the H&P had been written.    Vitals:    06/02/25 1031   BP: 145/68   Pulse: 86   Resp: 16   Temp: (!) 97.4 °F (36.3 °C)   SpO2: 98%

## 2025-06-02 NOTE — TELEPHONE ENCOUNTER
Patient called, state she is at [facility], advised lab orders completed. Warm transferred patient to practice([facility]).

## 2025-06-09 ENCOUNTER — RESULTS FOLLOW-UP (OUTPATIENT)
Dept: OBGYN CLINIC | Facility: CLINIC | Age: 65
End: 2025-06-09

## 2025-06-09 LAB
LAB AP GYN PRIMARY INTERPRETATION: NORMAL
Lab: NORMAL

## 2025-06-10 ENCOUNTER — TELEPHONE (OUTPATIENT)
Dept: GYNECOLOGIC ONCOLOGY | Facility: CLINIC | Age: 65
End: 2025-06-10

## 2025-06-10 ENCOUNTER — DOCUMENTATION (OUTPATIENT)
Dept: HEMATOLOGY ONCOLOGY | Facility: CLINIC | Age: 65
End: 2025-06-10

## 2025-06-10 DIAGNOSIS — C55 SEROUS ADENOCARCINOMA OF UTERUS (HCC): Primary | ICD-10-CM

## 2025-06-10 PROCEDURE — 88342 IMHCHEM/IMCYTCHM 1ST ANTB: CPT | Performed by: PATHOLOGY

## 2025-06-10 PROCEDURE — 88305 TISSUE EXAM BY PATHOLOGIST: CPT | Performed by: PATHOLOGY

## 2025-06-10 PROCEDURE — 88341 IMHCHEM/IMCYTCHM EA ADD ANTB: CPT | Performed by: PATHOLOGY

## 2025-06-10 NOTE — TELEPHONE ENCOUNTER
Called patient, had sooner appt with Dr. Feliz this Friday 6/13 at 3pm. Patient agreed to new date/time.

## 2025-06-10 NOTE — PROGRESS NOTES
All records needed are in patients chart. No records retrieval needed at this time.  [] Email sent to pt with Authorization for Release of Health Information  Pt referred to GynOnc and should be scheduled within 7 days, scheduled 6/17/2025.      Referral received/ Chart reviewed for work up completed     Imaging completed:    [] PET/CT   [] MRI   [] CT   [] US   [] Mammo   [] Bone scan   [] N/A    Pathology completed:    Date:   Location:   []N/A    Additional records needed:    [] Genomic report   [] Genetic testing results   [] Office Note   [] Procedure/ Operative note   [] Lab results   [] N/A      [] Radiation Oncology records retrieval needed (PN to route to rad/onc clerical pool once scheduled)  Date:  Location:

## 2025-06-12 PROBLEM — C54.1 ENDOMETRIAL CARCINOMA (HCC): Status: ACTIVE | Noted: 2025-06-12

## 2025-06-12 NOTE — PROGRESS NOTES
Name: Evelina Ford      : 1960      MRN: [id number]  Encounter Provider: July Feliz MD  Encounter Date: 2025   Encounter department: [facility]  :  Assessment & Plan  Endometrial carcinoma (HCC)  We reviewed her D&C pathology and discussed in depth the pathology results. I reviewed normal pelvic anatomy and karina pictures of pelvic anatomy to illustrate.     We discussed the diagnosis and usual initial management of early stage endometrial carcinoma, including hysterectomy and bilateral salpingo-oophorectomy, and sentinel lymph node evaluation with omental biopsy and pelvic washings. We discussed that if SLN mapping is not possible, full pelvic lymphadenectomy will be performed. We discussed recommendation for preoperative CT CAP to assess for metastatic disease in serous endometrial cancer histologies. We discussed that if metastatic disease detected, the management may change to involve neoadjuvant chemotherapy and/or radiation.     We discussed the expectations of laparoscopic surgery in terms postoperative pain, hospital length of stay, and functional recovery. She understands that the risks of major complications are approximately 5% and include but are not limited to hemorrhage requiring transfusion, intestinal/urinary tract injury, wound healing impairment, infection, thrombo-embolic complications, cardio-pulmonary and renal complications. She also understands the possibility of conversion to laparotomy for issues related to safety or findings suggesting more advanced disease than expected where the surgery could not be technically completed laparoscopically. She has requested that no other people are in the room aside from myself, and I explained in detail the team members involved in a successful surgery including anesthesia, nursing, surgical assists, and trainees.    The patient desires to proceed with total robotic-assisted laparoscopic  hysterectomy, bilateral salpingo-oophorectomy, and sentinel lymph node mapping and biopsy, staging, all other indicated procedures.    - Signed surgical consents   - Preop labs, EKG  - Preop CT CAP for evaluation of metastatic disease given serous histology   - Ca125 given serous histology   - No medical/cardiac clearance needed   - Periop med mgmt: no chronic meds   - 2wk/6wk POV    She understands that if serous endometrial cancer confirmed on final pathology, most patients require adjuvant treatment with chemotherapy +/- radiation. We briefly discussed the range of possible prognoses/outcomes pending final stage.    Orders:    Case request operating room: HYSTERECTOMY LAPAROSCOPIC TOTAL (LTH) W/ ROBOTICS, BILATERAL SALPINGO-OOPHORECTOMY, BILATERAL SENTINEL LYMPH NODE BIOPSY, STAGING, EXAM UNDER ANESTHESIA, ALL OTHER INDICATED PROCEDURES; Standing    CBC and Platelet; Future    Type and screen; Future    EKG 12 lead; Future    Basic metabolic panel; Future    CT chest abdomen pelvis w contrast; Future    ; Future      History of Present Illness   Reason for Visit / CC:  Newly diagnosed endometrial carcinoma    Evelina Ford is a 64 y.o. female  with no significant PMH presenting with newly diagnosed serous endometrial carcinoma.    She originally presented with light pink discharge in late February with faint yellow discharge and bleeding since that time. Pelvic US on  with 7.9 x 4.1 x 4.7cm uterus, EMS 19mm, ROV/LOV wnl. She underwent Hsc/D&C on . Pathology resulted with MMRp high grade carcinoma (favor serous).    Patient currently denies vaginal bleeding/discharge. She she denies abdominal/pelvic pain, early satiety/bloating, nausea/emesis, unanticipated weight loss, or changes in urinary/bowel habits.    PSH: Hsc/D&C, BTL, umbilical hernia repair ()  BMI: 35.9    Screening:  - Cervix: 2025 NILM  - Breast: 2025 BIRADS1  - CRC: 2025 FIT test negative        Review of Systems A complete  "review of systems is negative other than that noted above in the HPI.  Past Medical History   Past Medical History[1]  Past Surgical History[2]  Family History[3]   reports that she has never smoked. She has never used smokeless tobacco. She reports that she does not drink alcohol and does not use drugs.  Current Outpatient Medications   Medication Instructions    acetaminophen (TYLENOL) 650 mg, Oral, Every 6 hours PRN    fluocinonide (LIDEX) 0.05 % external solution APPLY TOPICALLY TO THE SCALP DAILY AS NEEDED    fluticasone (FLONASE) 50 mcg/act nasal spray 1 spray, Daily    ibuprofen (MOTRIN) 600 mg, Oral, Every 6 hours PRN   Allergies[4]      Objective   /90 Comment: pt is nervous  Pulse 93   Temp 97.5 °F (36.4 °C) (Temporal)   Ht 5' 1\" (1.549 m)   Wt 87.5 kg (193 lb)   SpO2 97%   BMI 36.47 kg/m²     Body mass index is 36.47 kg/m².  Pain Screening:     ECOG ECOG Performance Status: 0 - Fully active, able to carry on all pre-disease performance without restriction   Physical Exam  Vitals reviewed. Exam conducted with a chaperone present.   Constitutional:       General: She is not in acute distress.     Appearance: Normal appearance. She is not ill-appearing.   HENT:      Head: Normocephalic and atraumatic.      Mouth/Throat:      Mouth: Mucous membranes are moist.     Eyes:      General: No scleral icterus.        Right eye: No discharge.         Left eye: No discharge.      Conjunctiva/sclera: Conjunctivae normal.       Cardiovascular:      Rate and Rhythm: Normal rate and regular rhythm.      Heart sounds: No murmur heard.     No gallop.   Pulmonary:      Effort: Pulmonary effort is normal. No respiratory distress.      Breath sounds: No rhonchi.   Abdominal:      Palpations: Abdomen is soft. There is no mass.      Tenderness: There is no abdominal tenderness.      Hernia: A hernia (supraumbilical) is present.      Comments: Prior umbilical incision noted   Genitourinary:     Comments: The external " "female genitalia is normal. The bartholin's, uretheral and skenes glands are normal. The urethral meatus is normal (midline with no lesions). Anus without fissure or lesion. Speculum exam reveals a grossly normal vagina and cervix with no masses, lesions,discharge or bleeding. Bimanual exam with small mobile uterus and no adnexal masses or tenderness.    Musculoskeletal:      Right lower leg: No edema.      Left lower leg: No edema.     Skin:     General: Skin is warm and dry.      Coloration: Skin is not jaundiced.      Findings: No rash.     Neurological:      General: No focal deficit present.      Mental Status: She is alert and oriented to person, place, and time.      Cranial Nerves: No cranial nerve deficit.      Sensory: No sensory deficit.      Motor: No weakness.      Gait: Gait normal.     Psychiatric:         Mood and Affect: Mood normal.         Behavior: Behavior normal.         Thought Content: Thought content normal.         Judgment: Judgment normal.        Labs: I have reviewed pertinent labs.   Lab Results   Component Value Date/Time     5.1 06/13/2025 04:57 PM     Lab Results   Component Value Date/Time    Potassium 4.0 06/13/2025 04:57 PM    Chloride 105 06/13/2025 04:57 PM    CO2 29 06/13/2025 04:57 PM    BUN 20 06/13/2025 04:57 PM    Creatinine 0.72 06/13/2025 04:57 PM    Calcium 9.4 06/13/2025 04:57 PM    eGFR 89 06/13/2025 04:57 PM     Lab Results   Component Value Date/Time    WBC 8.28 06/13/2025 04:57 PM    Hemoglobin 12.7 06/13/2025 04:57 PM    Hematocrit 38.3 06/13/2025 04:57 PM    MCV 89 06/13/2025 04:57 PM    Platelets 264 06/13/2025 04:57 PM     No results found for: \"NEUTROABS\"     Trend:  Lab Results   Component Value Date     5.1 06/13/2025       Radiology Results Review: I personally reviewed the following image studies in PACS and associated radiology reports: Ultrasound(s). My interpretation of the radiology images/reports is: 8cm uterus with thickened " endometrial stripe, no adnexal masses.  Other Study Results Review : Pathology reports reviewed.    Administrative Statements   I have spent a total time of 55 minutes in caring for this patient on the day of the visit/encounter including Diagnostic results, Prognosis, Risks and benefits of tx options, Instructions for management, Patient and family education, and Reviewing/placing orders in the medical record (including tests, medications, and/or procedures).    July Feliz MD, MPH  Division of Gynecologic Oncology  06/14/25 12:05 PM         [1]   Past Medical History:  Diagnosis Date    No pertinent past medical history    [2]   Past Surgical History:  Procedure Laterality Date    HERNIA REPAIR  1992    naval hernia    [place] HYSTEROSCOPY BX ENDOMETRIUM&/POLYPC W/WO D&C N/A 06/02/2025    Procedure: EXAM UNDER ANESTHESIA; DILATATION AND CURETTAGE (D&C) WITH HYSTEROSCOPY. RESECTION OF UTERINE PATHOLOGY.;  Surgeon: Ana Florez DO;  Location: [facility];  Service: Gynecology    TUBAL LIGATION  1992   [3]   Family History  Problem Relation Name Age of Onset    Diabetes Mother Mother     Breast cancer Neg Hx      Colon cancer Neg Hx      Ovarian cancer Neg Hx     [4]   Allergies  Allergen Reactions    Aspirin Other (See Comments)     Avoids ASA due to venous anomaly

## 2025-06-12 NOTE — ASSESSMENT & PLAN NOTE
We reviewed her D&C pathology and discussed in depth the pathology results. I reviewed normal pelvic anatomy and karina pictures of pelvic anatomy to illustrate.     We discussed the diagnosis and usual initial management of early stage endometrial carcinoma, including hysterectomy and bilateral salpingo-oophorectomy, and sentinel lymph node evaluation with omental biopsy and pelvic washings. We discussed that if SLN mapping is not possible, full pelvic lymphadenectomy will be performed. We discussed recommendation for preoperative CT CAP to assess for metastatic disease in serous endometrial cancer histologies. We discussed that if metastatic disease detected, the management may change to involve neoadjuvant chemotherapy and/or radiation.     We discussed the expectations of laparoscopic surgery in terms postoperative pain, hospital length of stay, and functional recovery. She understands that the risks of major complications are approximately 5% and include but are not limited to hemorrhage requiring transfusion, intestinal/urinary tract injury, wound healing impairment, infection, thrombo-embolic complications, cardio-pulmonary and renal complications. She also understands the possibility of conversion to laparotomy for issues related to safety or findings suggesting more advanced disease than expected where the surgery could not be technically completed laparoscopically. She has requested that no other people are in the room aside from myself, and I explained in detail the team members involved in a successful surgery including anesthesia, nursing, surgical assists, and trainees.    The patient desires to proceed with total robotic-assisted laparoscopic hysterectomy, bilateral salpingo-oophorectomy, and sentinel lymph node mapping and biopsy, staging, all other indicated procedures.    - Signed surgical consents   - Preop labs, EKG  - Preop CT CAP for evaluation of metastatic disease given serous histology   -  Ca125 given serous histology   - No medical/cardiac clearance needed   - Periop med mgmt: no chronic meds   - 2wk/6wk POV    She understands that if serous endometrial cancer confirmed on final pathology, most patients require adjuvant treatment with chemotherapy +/- radiation. We briefly discussed the range of possible prognoses/outcomes pending final stage.    Orders:    Case request operating room: HYSTERECTOMY LAPAROSCOPIC TOTAL (LTH) W/ ROBOTICS, BILATERAL SALPINGO-OOPHORECTOMY, BILATERAL SENTINEL LYMPH NODE BIOPSY, STAGING, EXAM UNDER ANESTHESIA, ALL OTHER INDICATED PROCEDURES; Standing    CBC and Platelet; Future    Type and screen; Future    EKG 12 lead; Future    Basic metabolic panel; Future    CT chest abdomen pelvis w contrast; Future    ; Future

## 2025-06-12 NOTE — H&P (VIEW-ONLY)
Name: Evelina Ford      : 1960      MRN: [id number]  Encounter Provider: July Feliz MD  Encounter Date: 2025   Encounter department: [facility]  :  Assessment & Plan  Endometrial carcinoma (HCC)  We reviewed her D&C pathology and discussed in depth the pathology results. I reviewed normal pelvic anatomy and karina pictures of pelvic anatomy to illustrate.     We discussed the diagnosis and usual initial management of early stage endometrial carcinoma, including hysterectomy and bilateral salpingo-oophorectomy, and sentinel lymph node evaluation with omental biopsy and pelvic washings. We discussed that if SLN mapping is not possible, full pelvic lymphadenectomy will be performed. We discussed recommendation for preoperative CT CAP to assess for metastatic disease in serous endometrial cancer histologies. We discussed that if metastatic disease detected, the management may change to involve neoadjuvant chemotherapy and/or radiation.     We discussed the expectations of laparoscopic surgery in terms postoperative pain, hospital length of stay, and functional recovery. She understands that the risks of major complications are approximately 5% and include but are not limited to hemorrhage requiring transfusion, intestinal/urinary tract injury, wound healing impairment, infection, thrombo-embolic complications, cardio-pulmonary and renal complications. She also understands the possibility of conversion to laparotomy for issues related to safety or findings suggesting more advanced disease than expected where the surgery could not be technically completed laparoscopically. She has requested that no other people are in the room aside from myself, and I explained in detail the team members involved in a successful surgery including anesthesia, nursing, surgical assists, and trainees.    The patient desires to proceed with total robotic-assisted laparoscopic  hysterectomy, bilateral salpingo-oophorectomy, and sentinel lymph node mapping and biopsy, staging, all other indicated procedures.    - Signed surgical consents   - Preop labs, EKG  - Preop CT CAP for evaluation of metastatic disease given serous histology   - Ca125 given serous histology   - No medical/cardiac clearance needed   - Periop med mgmt: no chronic meds   - 2wk/6wk POV    She understands that if serous endometrial cancer confirmed on final pathology, most patients require adjuvant treatment with chemotherapy +/- radiation. We briefly discussed the range of possible prognoses/outcomes pending final stage.    Orders:    Case request operating room: HYSTERECTOMY LAPAROSCOPIC TOTAL (LTH) W/ ROBOTICS, BILATERAL SALPINGO-OOPHORECTOMY, BILATERAL SENTINEL LYMPH NODE BIOPSY, STAGING, EXAM UNDER ANESTHESIA, ALL OTHER INDICATED PROCEDURES; Standing    CBC and Platelet; Future    Type and screen; Future    EKG 12 lead; Future    Basic metabolic panel; Future    CT chest abdomen pelvis w contrast; Future    ; Future      History of Present Illness   Reason for Visit / CC:  Newly diagnosed endometrial carcinoma    Evelina Ford is a 64 y.o. female  with no significant PMH presenting with newly diagnosed serous endometrial carcinoma.    She originally presented with light pink discharge in late February with faint yellow discharge and bleeding since that time. Pelvic US on  with 7.9 x 4.1 x 4.7cm uterus, EMS 19mm, ROV/LOV wnl. She underwent Hsc/D&C on . Pathology resulted with MMRp high grade carcinoma (favor serous).    Patient currently denies vaginal bleeding/discharge. She she denies abdominal/pelvic pain, early satiety/bloating, nausea/emesis, unanticipated weight loss, or changes in urinary/bowel habits.    PSH: Hsc/D&C, BTL, umbilical hernia repair ()  BMI: 35.9    Screening:  - Cervix: 2025 NILM  - Breast: 2025 BIRADS1  - CRC: 2025 FIT test negative        Review of Systems A complete  "review of systems is negative other than that noted above in the HPI.  Past Medical History   Past Medical History[1]  Past Surgical History[2]  Family History[3]   reports that she has never smoked. She has never used smokeless tobacco. She reports that she does not drink alcohol and does not use drugs.  Current Outpatient Medications   Medication Instructions    acetaminophen (TYLENOL) 650 mg, Oral, Every 6 hours PRN    fluocinonide (LIDEX) 0.05 % external solution APPLY TOPICALLY TO THE SCALP DAILY AS NEEDED    fluticasone (FLONASE) 50 mcg/act nasal spray 1 spray, Daily    ibuprofen (MOTRIN) 600 mg, Oral, Every 6 hours PRN   Allergies[4]      Objective   /90 Comment: pt is nervous  Pulse 93   Temp 97.5 °F (36.4 °C) (Temporal)   Ht 5' 1\" (1.549 m)   Wt 87.5 kg (193 lb)   SpO2 97%   BMI 36.47 kg/m²     Body mass index is 36.47 kg/m².  Pain Screening:     ECOG ECOG Performance Status: 0 - Fully active, able to carry on all pre-disease performance without restriction   Physical Exam  Vitals reviewed. Exam conducted with a chaperone present.   Constitutional:       General: She is not in acute distress.     Appearance: Normal appearance. She is not ill-appearing.   HENT:      Head: Normocephalic and atraumatic.      Mouth/Throat:      Mouth: Mucous membranes are moist.     Eyes:      General: No scleral icterus.        Right eye: No discharge.         Left eye: No discharge.      Conjunctiva/sclera: Conjunctivae normal.       Cardiovascular:      Rate and Rhythm: Normal rate and regular rhythm.      Heart sounds: No murmur heard.     No gallop.   Pulmonary:      Effort: Pulmonary effort is normal. No respiratory distress.      Breath sounds: No rhonchi.   Abdominal:      Palpations: Abdomen is soft. There is no mass.      Tenderness: There is no abdominal tenderness.      Hernia: A hernia (supraumbilical) is present.      Comments: Prior umbilical incision noted   Genitourinary:     Comments: The external " "female genitalia is normal. The bartholin's, uretheral and skenes glands are normal. The urethral meatus is normal (midline with no lesions). Anus without fissure or lesion. Speculum exam reveals a grossly normal vagina and cervix with no masses, lesions,discharge or bleeding. Bimanual exam with small mobile uterus and no adnexal masses or tenderness.    Musculoskeletal:      Right lower leg: No edema.      Left lower leg: No edema.     Skin:     General: Skin is warm and dry.      Coloration: Skin is not jaundiced.      Findings: No rash.     Neurological:      General: No focal deficit present.      Mental Status: She is alert and oriented to person, place, and time.      Cranial Nerves: No cranial nerve deficit.      Sensory: No sensory deficit.      Motor: No weakness.      Gait: Gait normal.     Psychiatric:         Mood and Affect: Mood normal.         Behavior: Behavior normal.         Thought Content: Thought content normal.         Judgment: Judgment normal.        Labs: I have reviewed pertinent labs.   Lab Results   Component Value Date/Time     5.1 06/13/2025 04:57 PM     Lab Results   Component Value Date/Time    Potassium 4.0 06/13/2025 04:57 PM    Chloride 105 06/13/2025 04:57 PM    CO2 29 06/13/2025 04:57 PM    BUN 20 06/13/2025 04:57 PM    Creatinine 0.72 06/13/2025 04:57 PM    Calcium 9.4 06/13/2025 04:57 PM    eGFR 89 06/13/2025 04:57 PM     Lab Results   Component Value Date/Time    WBC 8.28 06/13/2025 04:57 PM    Hemoglobin 12.7 06/13/2025 04:57 PM    Hematocrit 38.3 06/13/2025 04:57 PM    MCV 89 06/13/2025 04:57 PM    Platelets 264 06/13/2025 04:57 PM     No results found for: \"NEUTROABS\"     Trend:  Lab Results   Component Value Date     5.1 06/13/2025       Radiology Results Review: I personally reviewed the following image studies in PACS and associated radiology reports: Ultrasound(s). My interpretation of the radiology images/reports is: 8cm uterus with thickened " endometrial stripe, no adnexal masses.  Other Study Results Review : Pathology reports reviewed.    Administrative Statements   I have spent a total time of 55 minutes in caring for this patient on the day of the visit/encounter including Diagnostic results, Prognosis, Risks and benefits of tx options, Instructions for management, Patient and family education, and Reviewing/placing orders in the medical record (including tests, medications, and/or procedures).    July Feliz MD, MPH  Division of Gynecologic Oncology  06/14/25 12:05 PM         [1]   Past Medical History:  Diagnosis Date    No pertinent past medical history    [2]   Past Surgical History:  Procedure Laterality Date    HERNIA REPAIR  1992    naval hernia    [place] HYSTEROSCOPY BX ENDOMETRIUM&/POLYPC W/WO D&C N/A 06/02/2025    Procedure: EXAM UNDER ANESTHESIA; DILATATION AND CURETTAGE (D&C) WITH HYSTEROSCOPY. RESECTION OF UTERINE PATHOLOGY.;  Surgeon: Ana Florez DO;  Location: [facility];  Service: Gynecology    TUBAL LIGATION  1992   [3]   Family History  Problem Relation Name Age of Onset    Diabetes Mother Mother     Breast cancer Neg Hx      Colon cancer Neg Hx      Ovarian cancer Neg Hx     [4]   Allergies  Allergen Reactions    Aspirin Other (See Comments)     Avoids ASA due to venous anomaly

## 2025-06-13 ENCOUNTER — APPOINTMENT (OUTPATIENT)
Dept: LAB | Facility: CLINIC | Age: 65
End: 2025-06-13
Attending: OBSTETRICS & GYNECOLOGY
Payer: COMMERCIAL

## 2025-06-13 ENCOUNTER — LAB REQUISITION (OUTPATIENT)
Dept: LAB | Facility: HOSPITAL | Age: 65
End: 2025-06-13
Payer: COMMERCIAL

## 2025-06-13 ENCOUNTER — CONSULT (OUTPATIENT)
Dept: GYNECOLOGIC ONCOLOGY | Facility: CLINIC | Age: 65
End: 2025-06-13
Attending: OBSTETRICS & GYNECOLOGY
Payer: COMMERCIAL

## 2025-06-13 VITALS
SYSTOLIC BLOOD PRESSURE: 144 MMHG | HEART RATE: 93 BPM | TEMPERATURE: 97.5 F | OXYGEN SATURATION: 97 % | HEIGHT: 61 IN | DIASTOLIC BLOOD PRESSURE: 90 MMHG | BODY MASS INDEX: 36.44 KG/M2 | WEIGHT: 193 LBS

## 2025-06-13 DIAGNOSIS — C54.1 ENDOMETRIAL CARCINOMA (HCC): ICD-10-CM

## 2025-06-13 DIAGNOSIS — C54.1 MALIGNANT NEOPLASM OF ENDOMETRIUM (HCC): ICD-10-CM

## 2025-06-13 DIAGNOSIS — R73.01 IMPAIRED FASTING GLUCOSE: ICD-10-CM

## 2025-06-13 DIAGNOSIS — C54.1 ENDOMETRIAL CARCINOMA (HCC): Primary | ICD-10-CM

## 2025-06-13 DIAGNOSIS — C54.1 ENDOMETRIAL SARCOMA (HCC): ICD-10-CM

## 2025-06-13 LAB
ABO GROUP BLD: NORMAL
ANION GAP SERPL CALCULATED.3IONS-SCNC: 5 MMOL/L (ref 4–13)
BLD GP AB SCN SERPL QL: NEGATIVE
BUN SERPL-MCNC: 20 MG/DL (ref 5–25)
CALCIUM SERPL-MCNC: 9.4 MG/DL (ref 8.4–10.2)
CANCER AG125 SERPL-ACNC: 5.1 U/ML (ref 0–35)
CHLORIDE SERPL-SCNC: 105 MMOL/L (ref 96–108)
CO2 SERPL-SCNC: 29 MMOL/L (ref 21–32)
CREAT SERPL-MCNC: 0.72 MG/DL (ref 0.6–1.3)
ERYTHROCYTE [DISTWIDTH] IN BLOOD BY AUTOMATED COUNT: 13.2 % (ref 11.6–15.1)
EST. AVERAGE GLUCOSE BLD GHB EST-MCNC: 154 MG/DL
GFR SERPL CREATININE-BSD FRML MDRD: 89 ML/MIN/1.73SQ M
GLUCOSE SERPL-MCNC: 109 MG/DL (ref 65–140)
HBA1C MFR BLD: 7 %
HCT VFR BLD AUTO: 38.3 % (ref 34.8–46.1)
HGB BLD-MCNC: 12.7 G/DL (ref 11.5–15.4)
MCH RBC QN AUTO: 29.6 PG (ref 26.8–34.3)
MCHC RBC AUTO-ENTMCNC: 33.2 G/DL (ref 31.4–37.4)
MCV RBC AUTO: 89 FL (ref 82–98)
PLATELET # BLD AUTO: 264 THOUSANDS/UL (ref 149–390)
PMV BLD AUTO: 9.7 FL (ref 8.9–12.7)
POTASSIUM SERPL-SCNC: 4 MMOL/L (ref 3.5–5.3)
RBC # BLD AUTO: 4.29 MILLION/UL (ref 3.81–5.12)
RH BLD: POSITIVE
SODIUM SERPL-SCNC: 139 MMOL/L (ref 135–147)
SPECIMEN EXPIRATION DATE: NORMAL
WBC # BLD AUTO: 8.28 THOUSAND/UL (ref 4.31–10.16)

## 2025-06-13 PROCEDURE — 99205 OFFICE O/P NEW HI 60 MIN: CPT | Performed by: OBSTETRICS & GYNECOLOGY

## 2025-06-13 PROCEDURE — 86901 BLOOD TYPING SEROLOGIC RH(D): CPT | Performed by: OBSTETRICS & GYNECOLOGY

## 2025-06-13 PROCEDURE — 85027 COMPLETE CBC AUTOMATED: CPT

## 2025-06-13 PROCEDURE — 86900 BLOOD TYPING SEROLOGIC ABO: CPT | Performed by: OBSTETRICS & GYNECOLOGY

## 2025-06-13 PROCEDURE — 36415 COLL VENOUS BLD VENIPUNCTURE: CPT

## 2025-06-13 PROCEDURE — 86304 IMMUNOASSAY TUMOR CA 125: CPT

## 2025-06-13 PROCEDURE — 86850 RBC ANTIBODY SCREEN: CPT | Performed by: OBSTETRICS & GYNECOLOGY

## 2025-06-13 PROCEDURE — 99459 PELVIC EXAMINATION: CPT | Performed by: OBSTETRICS & GYNECOLOGY

## 2025-06-13 PROCEDURE — 80048 BASIC METABOLIC PNL TOTAL CA: CPT

## 2025-06-13 PROCEDURE — 83036 HEMOGLOBIN GLYCOSYLATED A1C: CPT

## 2025-06-13 RX ORDER — ACETAMINOPHEN 325 MG/1
975 TABLET ORAL ONCE
Status: CANCELLED | OUTPATIENT
Start: 2025-06-13 | End: 2025-06-13

## 2025-06-13 RX ORDER — SODIUM CHLORIDE, SODIUM LACTATE, POTASSIUM CHLORIDE, CALCIUM CHLORIDE 600; 310; 30; 20 MG/100ML; MG/100ML; MG/100ML; MG/100ML
20 INJECTION, SOLUTION INTRAVENOUS CONTINUOUS
Status: CANCELLED | OUTPATIENT
Start: 2025-06-13

## 2025-06-13 RX ORDER — HEPARIN SODIUM 5000 [USP'U]/ML
5000 INJECTION, SOLUTION INTRAVENOUS; SUBCUTANEOUS
Status: CANCELLED | OUTPATIENT
Start: 2025-06-14 | End: 2025-06-15

## 2025-06-14 ENCOUNTER — HOSPITAL ENCOUNTER (OUTPATIENT)
Dept: RADIOLOGY | Facility: HOSPITAL | Age: 65
Discharge: HOME | End: 2025-06-14
Attending: OBSTETRICS & GYNECOLOGY
Payer: COMMERCIAL

## 2025-06-14 DIAGNOSIS — C54.1 ENDOMETRIAL CARCINOMA (HCC): ICD-10-CM

## 2025-06-14 PROCEDURE — 74177 CT ABD & PELVIS W/CONTRAST: CPT

## 2025-06-14 PROCEDURE — 71260 CT THORAX DX C+: CPT

## 2025-06-14 RX ADMIN — IOHEXOL 100 ML: 350 INJECTION, SOLUTION INTRAVENOUS at 09:10

## 2025-06-16 ENCOUNTER — PATIENT OUTREACH (OUTPATIENT)
Dept: HEMATOLOGY ONCOLOGY | Facility: CLINIC | Age: 65
End: 2025-06-16

## 2025-06-16 ENCOUNTER — TELEPHONE (OUTPATIENT)
Dept: GYNECOLOGIC ONCOLOGY | Facility: CLINIC | Age: 65
End: 2025-06-16

## 2025-06-16 LAB
ATRIAL RATE: 65 BPM
P AXIS: 44 DEGREES
PR INTERVAL: 134 MS
QRS AXIS: -10 DEGREES
QRSD INTERVAL: 82 MS
QT INTERVAL: 404 MS
QTC INTERVAL: 420 MS
T WAVE AXIS: 3 DEGREES
VENTRICULAR RATE: 65 BPM

## 2025-06-16 PROCEDURE — 93010 ELECTROCARDIOGRAM REPORT: CPT | Performed by: INTERNAL MEDICINE

## 2025-06-16 NOTE — TELEPHONE ENCOUNTER
Phoned patient to discuss pre-op instruction meeting, patient will come to [facility] [place] today 6/16 at 1330 to discuss presurgical instructions, post op appointment scheduling, etc.

## 2025-06-16 NOTE — PROGRESS NOTES
I reached out and spoke with Evelina ,.  She has been seen in consult by Gyn Oncology. I introduced myself and explained my role as their Patient Navigator. I reviewed for any barriers to care and offered referrals to supportive services as needed. I reviewed and updated the members assigned to the care team in [facility]. She knows the members of the care team as well as how and when to contact them with any needs.     Distress Thermometer completed at this time. Patient scored 3/10. Based on responses to DT, no indication for referral to SW needed at this time. .     She is currently able to drive and denies any transportation needs.      She denies any uncontrolled symptoms. Discussed role of Palliative Care in symptom and side effect management. Declined referral at this time.    She states that she is eating and drinking as per usual with no unintentional weight loss.       Patient does not smoke.     She states she is well supported by family and friends.  Community support groups discussed including the Cancer Support Community of the [facility]. Patient declined information at this time.     She feels she has adequate insurance coverage and denies any financial concerns at this time.     She verbalizes managing the schedules well.   No future appointments.     Based on individual needs I will follow up in about 6 weeks. I have provided my direct contact information and welcome them to contact me if needs as discussed above change. She was appreciative for the call.

## 2025-06-17 ENCOUNTER — RESULTS FOLLOW-UP (OUTPATIENT)
Dept: GYNECOLOGIC ONCOLOGY | Facility: CLINIC | Age: 65
End: 2025-06-17

## 2025-06-19 ENCOUNTER — ANESTHESIA (OUTPATIENT)
Dept: PERIOP | Facility: HOSPITAL | Age: 65
End: 2025-06-19
Payer: COMMERCIAL

## 2025-06-19 ENCOUNTER — HOSPITAL ENCOUNTER (OUTPATIENT)
Facility: HOSPITAL | Age: 65
Setting detail: OUTPATIENT SURGERY
Discharge: HOME | End: 2025-06-19
Attending: OBSTETRICS & GYNECOLOGY | Admitting: OBSTETRICS & GYNECOLOGY
Payer: COMMERCIAL

## 2025-06-19 ENCOUNTER — ANESTHESIA EVENT (OUTPATIENT)
Dept: PERIOP | Facility: HOSPITAL | Age: 65
End: 2025-06-19
Payer: COMMERCIAL

## 2025-06-19 VITALS
HEIGHT: 61 IN | OXYGEN SATURATION: 98 % | SYSTOLIC BLOOD PRESSURE: 163 MMHG | WEIGHT: 192.9 LBS | TEMPERATURE: 97.7 F | HEART RATE: 80 BPM | DIASTOLIC BLOOD PRESSURE: 75 MMHG | RESPIRATION RATE: 16 BRPM | BODY MASS INDEX: 36.42 KG/M2

## 2025-06-19 DIAGNOSIS — C54.1 ENDOMETRIAL CARCINOMA (HCC): ICD-10-CM

## 2025-06-19 LAB
ABO GROUP BLD: NORMAL
GLUCOSE SERPL-MCNC: 137 MG/DL (ref 65–140)
GLUCOSE SERPL-MCNC: 203 MG/DL (ref 65–140)
RH BLD: POSITIVE

## 2025-06-19 PROCEDURE — S2900 ROBOTIC SURGICAL SYSTEM: HCPCS | Performed by: OBSTETRICS & GYNECOLOGY

## 2025-06-19 PROCEDURE — 88344 IMHCHEM/IMCYTCHM EA MLT ANTB: CPT | Performed by: PATHOLOGY

## 2025-06-19 PROCEDURE — 88307 TISSUE EXAM BY PATHOLOGIST: CPT | Performed by: PATHOLOGY

## 2025-06-19 PROCEDURE — 38900 IO MAP OF SENT LYMPH NODE: CPT | Performed by: OBSTETRICS & GYNECOLOGY

## 2025-06-19 PROCEDURE — 88305 TISSUE EXAM BY PATHOLOGIST: CPT | Performed by: PATHOLOGY

## 2025-06-19 PROCEDURE — NC001 PR NO CHARGE: Performed by: PHYSICIAN ASSISTANT

## 2025-06-19 PROCEDURE — 82948 REAGENT STRIP/BLOOD GLUCOSE: CPT

## 2025-06-19 PROCEDURE — 38570 LAPAROSCOPY LYMPH NODE BIOP: CPT | Performed by: OBSTETRICS & GYNECOLOGY

## 2025-06-19 PROCEDURE — 49321 LAPAROSCOPY BIOPSY: CPT | Performed by: OBSTETRICS & GYNECOLOGY

## 2025-06-19 PROCEDURE — 88341 IMHCHEM/IMCYTCHM EA ADD ANTB: CPT | Performed by: PATHOLOGY

## 2025-06-19 PROCEDURE — 88342 IMHCHEM/IMCYTCHM 1ST ANTB: CPT | Performed by: PATHOLOGY

## 2025-06-19 PROCEDURE — 88331 PATH CONSLTJ SURG 1 BLK 1SPC: CPT | Performed by: PATHOLOGY

## 2025-06-19 PROCEDURE — 88112 CYTOPATH CELL ENHANCE TECH: CPT | Performed by: PATHOLOGY

## 2025-06-19 PROCEDURE — 88309 TISSUE EXAM BY PATHOLOGIST: CPT | Performed by: PATHOLOGY

## 2025-06-19 PROCEDURE — 58571 TLH W/T/O 250 G OR LESS: CPT | Performed by: OBSTETRICS & GYNECOLOGY

## 2025-06-19 RX ORDER — ACETAMINOPHEN 325 MG/1
975 TABLET ORAL ONCE
Status: COMPLETED | OUTPATIENT
Start: 2025-06-19 | End: 2025-06-19

## 2025-06-19 RX ORDER — OXYCODONE HYDROCHLORIDE 5 MG/1
5 TABLET ORAL EVERY 4 HOURS PRN
Qty: 5 TABLET | Refills: 0 | Status: SHIPPED | OUTPATIENT
Start: 2025-06-19 | End: 2025-06-29

## 2025-06-19 RX ORDER — ONDANSETRON 2 MG/ML
4 INJECTION INTRAMUSCULAR; INTRAVENOUS EVERY 6 HOURS PRN
Status: DISCONTINUED | OUTPATIENT
Start: 2025-06-19 | End: 2025-06-19 | Stop reason: HOSPADM

## 2025-06-19 RX ORDER — INDOCYANINE GREEN AND WATER 25 MG
KIT INJECTION AS NEEDED
Status: DISCONTINUED | OUTPATIENT
Start: 2025-06-19 | End: 2025-06-19 | Stop reason: HOSPADM

## 2025-06-19 RX ORDER — FENTANYL CITRATE/PF 50 MCG/ML
25 SYRINGE (ML) INJECTION
Status: DISCONTINUED | OUTPATIENT
Start: 2025-06-19 | End: 2025-06-19 | Stop reason: HOSPADM

## 2025-06-19 RX ORDER — METRONIDAZOLE 500 MG/100ML
INJECTION, SOLUTION INTRAVENOUS CONTINUOUS PRN
Status: DISCONTINUED | OUTPATIENT
Start: 2025-06-19 | End: 2025-06-19

## 2025-06-19 RX ORDER — HYDROMORPHONE HCL IN WATER/PF 6 MG/30 ML
0.2 PATIENT CONTROLLED ANALGESIA SYRINGE INTRAVENOUS
Status: DISCONTINUED | OUTPATIENT
Start: 2025-06-19 | End: 2025-06-19 | Stop reason: HOSPADM

## 2025-06-19 RX ORDER — EPHEDRINE SULFATE 50 MG/ML
INJECTION INTRAVENOUS AS NEEDED
Status: DISCONTINUED | OUTPATIENT
Start: 2025-06-19 | End: 2025-06-19

## 2025-06-19 RX ORDER — FENTANYL CITRATE 50 UG/ML
INJECTION, SOLUTION INTRAMUSCULAR; INTRAVENOUS AS NEEDED
Status: DISCONTINUED | OUTPATIENT
Start: 2025-06-19 | End: 2025-06-19

## 2025-06-19 RX ORDER — PROPOFOL 10 MG/ML
INJECTION, EMULSION INTRAVENOUS AS NEEDED
Status: DISCONTINUED | OUTPATIENT
Start: 2025-06-19 | End: 2025-06-19

## 2025-06-19 RX ORDER — ONDANSETRON 2 MG/ML
INJECTION INTRAMUSCULAR; INTRAVENOUS AS NEEDED
Status: DISCONTINUED | OUTPATIENT
Start: 2025-06-19 | End: 2025-06-19

## 2025-06-19 RX ORDER — DEXAMETHASONE SODIUM PHOSPHATE 10 MG/ML
INJECTION, SOLUTION INTRAMUSCULAR; INTRAVENOUS AS NEEDED
Status: DISCONTINUED | OUTPATIENT
Start: 2025-06-19 | End: 2025-06-19

## 2025-06-19 RX ORDER — MIDAZOLAM HYDROCHLORIDE 2 MG/2ML
INJECTION, SOLUTION INTRAMUSCULAR; INTRAVENOUS AS NEEDED
Status: DISCONTINUED | OUTPATIENT
Start: 2025-06-19 | End: 2025-06-19

## 2025-06-19 RX ORDER — BUPIVACAINE HYDROCHLORIDE 2.5 MG/ML
INJECTION, SOLUTION EPIDURAL; INFILTRATION; INTRACAUDAL; PERINEURAL AS NEEDED
Status: DISCONTINUED | OUTPATIENT
Start: 2025-06-19 | End: 2025-06-19 | Stop reason: HOSPADM

## 2025-06-19 RX ORDER — OXYCODONE HYDROCHLORIDE 5 MG/1
5 TABLET ORAL EVERY 4 HOURS PRN
Status: DISCONTINUED | OUTPATIENT
Start: 2025-06-19 | End: 2025-06-19 | Stop reason: HOSPADM

## 2025-06-19 RX ORDER — HEPARIN SODIUM 5000 [USP'U]/ML
5000 INJECTION, SOLUTION INTRAVENOUS; SUBCUTANEOUS
Status: COMPLETED | OUTPATIENT
Start: 2025-06-19 | End: 2025-06-19

## 2025-06-19 RX ORDER — KETAMINE HCL IN NACL, ISO-OSM 100MG/10ML
SYRINGE (ML) INJECTION AS NEEDED
Status: DISCONTINUED | OUTPATIENT
Start: 2025-06-19 | End: 2025-06-19

## 2025-06-19 RX ORDER — MAGNESIUM HYDROXIDE 1200 MG/15ML
LIQUID ORAL AS NEEDED
Status: DISCONTINUED | OUTPATIENT
Start: 2025-06-19 | End: 2025-06-19 | Stop reason: HOSPADM

## 2025-06-19 RX ORDER — SODIUM CHLORIDE, SODIUM LACTATE, POTASSIUM CHLORIDE, CALCIUM CHLORIDE 600; 310; 30; 20 MG/100ML; MG/100ML; MG/100ML; MG/100ML
20 INJECTION, SOLUTION INTRAVENOUS CONTINUOUS
Status: DISCONTINUED | OUTPATIENT
Start: 2025-06-19 | End: 2025-06-19 | Stop reason: HOSPADM

## 2025-06-19 RX ORDER — ACETAMINOPHEN 325 MG/1
975 TABLET ORAL EVERY 6 HOURS PRN
Status: DISCONTINUED | OUTPATIENT
Start: 2025-06-19 | End: 2025-06-19 | Stop reason: HOSPADM

## 2025-06-19 RX ORDER — KETOROLAC TROMETHAMINE 30 MG/ML
INJECTION, SOLUTION INTRAMUSCULAR; INTRAVENOUS AS NEEDED
Status: DISCONTINUED | OUTPATIENT
Start: 2025-06-19 | End: 2025-06-19

## 2025-06-19 RX ORDER — SODIUM CHLORIDE 9 MG/ML
INJECTION, SOLUTION INTRAVENOUS CONTINUOUS PRN
Status: DISCONTINUED | OUTPATIENT
Start: 2025-06-19 | End: 2025-06-19

## 2025-06-19 RX ORDER — PROPOFOL 10 MG/ML
INJECTION, EMULSION INTRAVENOUS CONTINUOUS PRN
Status: DISCONTINUED | OUTPATIENT
Start: 2025-06-19 | End: 2025-06-19

## 2025-06-19 RX ORDER — ROCURONIUM BROMIDE 10 MG/ML
INJECTION, SOLUTION INTRAVENOUS AS NEEDED
Status: DISCONTINUED | OUTPATIENT
Start: 2025-06-19 | End: 2025-06-19

## 2025-06-19 RX ORDER — LIDOCAINE HYDROCHLORIDE 10 MG/ML
INJECTION, SOLUTION EPIDURAL; INFILTRATION; INTRACAUDAL; PERINEURAL AS NEEDED
Status: DISCONTINUED | OUTPATIENT
Start: 2025-06-19 | End: 2025-06-19

## 2025-06-19 RX ORDER — ONDANSETRON 2 MG/ML
4 INJECTION INTRAMUSCULAR; INTRAVENOUS ONCE AS NEEDED
Status: DISCONTINUED | OUTPATIENT
Start: 2025-06-19 | End: 2025-06-19 | Stop reason: HOSPADM

## 2025-06-19 RX ORDER — CEFAZOLIN SODIUM 2 G/50ML
2000 SOLUTION INTRAVENOUS ONCE
Status: COMPLETED | OUTPATIENT
Start: 2025-06-19 | End: 2025-06-19

## 2025-06-19 RX ADMIN — PROPOFOL 50 MCG/KG/MIN: 10 INJECTION, EMULSION INTRAVENOUS at 12:00

## 2025-06-19 RX ADMIN — MIDAZOLAM 2 MG: 1 INJECTION INTRAMUSCULAR; INTRAVENOUS at 11:31

## 2025-06-19 RX ADMIN — METRONIDAZOLE: 500 SOLUTION INTRAVENOUS at 11:52

## 2025-06-19 RX ADMIN — ROCURONIUM BROMIDE 20 MG: 10 INJECTION, SOLUTION INTRAVENOUS at 13:47

## 2025-06-19 RX ADMIN — DEXMEDETOMIDINE HYDROCHLORIDE 8 MCG: 100 INJECTION, SOLUTION INTRAVENOUS at 14:48

## 2025-06-19 RX ADMIN — ROCURONIUM BROMIDE 50 MG: 10 INJECTION, SOLUTION INTRAVENOUS at 11:40

## 2025-06-19 RX ADMIN — FENTANYL CITRATE 25 MCG: 50 INJECTION INTRAMUSCULAR; INTRAVENOUS at 15:19

## 2025-06-19 RX ADMIN — PHENYLEPHRINE HYDROCHLORIDE 60 MCG/MIN: 50 INJECTION INTRAVENOUS at 11:51

## 2025-06-19 RX ADMIN — ROCURONIUM BROMIDE 10 MG: 10 INJECTION, SOLUTION INTRAVENOUS at 14:27

## 2025-06-19 RX ADMIN — KETOROLAC TROMETHAMINE 15 MG: 30 INJECTION, SOLUTION INTRAMUSCULAR; INTRAVENOUS at 14:40

## 2025-06-19 RX ADMIN — ACETAMINOPHEN 975 MG: 325 TABLET ORAL at 11:03

## 2025-06-19 RX ADMIN — SODIUM CHLORIDE: 0.9 INJECTION, SOLUTION INTRAVENOUS at 11:48

## 2025-06-19 RX ADMIN — SUGAMMADEX 200 MG: 100 INJECTION, SOLUTION INTRAVENOUS at 14:44

## 2025-06-19 RX ADMIN — FENTANYL CITRATE 25 MCG: 50 INJECTION INTRAMUSCULAR; INTRAVENOUS at 13:38

## 2025-06-19 RX ADMIN — Medication 20 MG: at 12:45

## 2025-06-19 RX ADMIN — FENTANYL CITRATE 50 MCG: 50 INJECTION INTRAMUSCULAR; INTRAVENOUS at 11:39

## 2025-06-19 RX ADMIN — LIDOCAINE HYDROCHLORIDE 50 MG: 10 INJECTION, SOLUTION EPIDURAL; INFILTRATION; INTRACAUDAL; PERINEURAL at 11:39

## 2025-06-19 RX ADMIN — FENTANYL CITRATE 25 MCG: 50 INJECTION INTRAMUSCULAR; INTRAVENOUS at 15:29

## 2025-06-19 RX ADMIN — Medication 30 MG: at 12:09

## 2025-06-19 RX ADMIN — HEPARIN SODIUM 5000 UNITS: 5000 INJECTION, SOLUTION INTRAVENOUS; SUBCUTANEOUS at 11:09

## 2025-06-19 RX ADMIN — SODIUM CHLORIDE, SODIUM LACTATE, POTASSIUM CHLORIDE, AND CALCIUM CHLORIDE 20 ML/HR: .6; .31; .03; .02 INJECTION, SOLUTION INTRAVENOUS at 11:38

## 2025-06-19 RX ADMIN — DEXMEDETOMIDINE HYDROCHLORIDE 8 MCG: 100 INJECTION, SOLUTION INTRAVENOUS at 14:32

## 2025-06-19 RX ADMIN — EPHEDRINE SULFATE 5 MG: 50 INJECTION, SOLUTION INTRAVENOUS at 12:22

## 2025-06-19 RX ADMIN — OXYCODONE HYDROCHLORIDE 5 MG: 5 TABLET ORAL at 16:23

## 2025-06-19 RX ADMIN — ROCURONIUM BROMIDE 20 MG: 10 INJECTION, SOLUTION INTRAVENOUS at 13:16

## 2025-06-19 RX ADMIN — FENTANYL CITRATE 25 MCG: 50 INJECTION INTRAMUSCULAR; INTRAVENOUS at 13:35

## 2025-06-19 RX ADMIN — CEFAZOLIN SODIUM 2000 MG: 2 SOLUTION INTRAVENOUS at 11:50

## 2025-06-19 RX ADMIN — DEXAMETHASONE SODIUM PHOSPHATE 10 MG: 10 INJECTION, SOLUTION INTRAMUSCULAR; INTRAVENOUS at 12:00

## 2025-06-19 RX ADMIN — PROPOFOL 200 MG: 10 INJECTION, EMULSION INTRAVENOUS at 11:39

## 2025-06-19 RX ADMIN — ROCURONIUM BROMIDE 30 MG: 10 INJECTION, SOLUTION INTRAVENOUS at 12:39

## 2025-06-19 RX ADMIN — SODIUM CHLORIDE, SODIUM LACTATE, POTASSIUM CHLORIDE, AND CALCIUM CHLORIDE: .6; .31; .03; .02 INJECTION, SOLUTION INTRAVENOUS at 11:31

## 2025-06-19 RX ADMIN — ONDANSETRON 4 MG: 2 INJECTION INTRAMUSCULAR; INTRAVENOUS at 14:38

## 2025-06-19 NOTE — DISCHARGE INSTR - AVS FIRST PAGE
[facility] - Gynecologic Oncology  Robson Spaulding, Fabrizio Farmer and Trini   [phone]    Hysterectomy Discharge Instructions    A hysterectomy is surgery to remove your uterus. Your ovaries, fallopian tubes, cervix, or part of your vagina may also need to be removed. The organs and tissue that will be removed depends on your medical condition.  After a hysterectomy, you will not be able to become pregnant.  If your ovaries are removed, you will go through menopause if you have not already.    DISCHARGE INSTRUCTIONS:     What to expect at home:   Recovery from surgery is generally 2-4 weeks, but sometimes longer for more strenuous activity. It is normal to be very tired during this time.   If you had laparoscopic/robotic surgery, you may experience gas pain, abdominal swelling, or shoulder pain for 24-72 hours after surgery. This is from the carbon dioxide gas put into your abdomen to better visualize your organs. A warm shower, heating pad, and/or walking may help.    Contact your doctor at the number above if:   You have a fever over 100.4o.  You have nausea or vomiting that does not improve after a light meal.  Your abdominal or pelvic pain gets worse, even after you take medicine.  You feel pain or burning when you urinate, or you have trouble urinating that worsens over time. Some burning in the first 1-2 days after surgery is expected after removal of the bladder catheter during surgery.  You have pus or a foul-smelling odor coming from your vagina.  Your wound is red, swollen, or drainage is persistent, thick/cloudy or foul smelling.  Clear/pink drainage or a minimal amount of bleeding from incisions can be normal after laparoscopic surgery.  Your arm or leg feels warm, tender, red, swollen and/or painful.   You have heavy vaginal bleeding that fills 1 or more sanitary pads in 1 hour. It is normal to have a small amount of vaginal bleeding or discharge after surgery  that would require the use of a light pantiliner. This discharge may last up to 6 weeks. The bleeding and discharge should be light and should have no odor.     CALL 911 OR GO TO THE EMERGENCY ROOM IF YOU HAVE: Any shortness of breath, difficulty breathing, or chest pain.    Pain  Pain after surgery is a challenging part of the healing process. Pain may be present for weeks but should gradually get better.   Please take extra strength acetaminophen (Tylenol) 1000 mg every 6 hours and then alternate with a NSAIDs such as ibuprofen (Advil, Motrin) 600 mg (3 tablets) every 6 hours to help decrease swelling, pain, and fever.   NSAIDs can cause stomach bleeding or kidney problems in certain people. If you take blood thinner medicine, always ask your healthcare provider if NSAIDs are safe for you. Always read the medicine label and follow directions.  The maximum dose of Tylenol is 4000 mg in 24 hours. The maximum dose of Advil/Motrin is 2400mg in 24 hours.   For moderate to severe pain, please take oxycodone 5 mg PO every 4-6 hours as needed or other narcotic that was prescribed by provider.     Anticoagulation    If provided with a prescription for anticoagulation such as Apixaban (Eliquis), please take the prescribed dose until completed.     Constipation  Constipation is common and it is normal to not have a bowel movement for up to one week after surgery. You should still be passing gas despite constipation and should be able to tolerate both liquid and solid food without nausea or vomiting.  Please take polyethylene glycol (Miralax) 17 g (1 measured capful or 1 packet) once a day. If you have not had a bowel movement 3 days after surgery, you may take the Miralax two times a day. The alternatives to Miralax include Senna and Colace.     If you have any discomfort because of the need to have a bowel movement, you may add milk of magnesia or magnesium citrate (available at your local pharmacy without a prescription)  at any time. Do not take milk of magnesia or magnesium citrate if you have kidney failure.   If you have loose or watery stools, stop taking the medications.     Take your medicine as directed. Contact your healthcare provider if you think your medicine is not helping or if you have side effects. Tell him or her if you are allergic to any medicine. Keep a list of the medicines, vitamins, and herbs you take. Include the amounts, and when and why you take them. Bring the list of the pill bottles to follow-up visits. Carry your medicine list with you in case of an emergency.    Activity:   Rest as needed. Get up and move around as directed to help prevent blood clots. Start with short walks and slowly increase the distance every day.     Stairs are okay to use. Use two feet on each stair to go up and down during the first several days to weeks after surgery.     Do not lift objects heavier than 10 pounds for 6 weeks whether you have small, laparoscopic incisions or you have a large, laparotomy incision.    Avoid strenuous activity for 2 weeks.     You may shower as soon as you return home. You can use soapy water surrounding the incision and let the water run over it. Pat the incision dry after your shower (see wound care section below)     Do not strain during bowel movements. High-fiber foods and extra liquids can help you prevent constipation. Examples of high-fiber foods are fruit and bran. Prune juice and water are good liquids to drink.      Do not have sex, use tampons, or douche for up to 8 weeks.   Do not go into pools or hot tubs for up to 8 weeks and/or until you have been cleared by your doctor.      It is generally safe to drive if you feel strong enough and your reaction time is not compromised and when no longer taking prescription/opioid pain medication    Ask when you may return to work and to other regular activities.    Wound care:   Care for your abdominal incisions as directed. Carefully wash  around the wound with soap and water. If you have Hibiclens or medicated soap that you were instructed to use before surgery, you may use that to wash with for up to 2 days after surgery.  If not, any mild non-scented, non-abrasive soap is safe.  It is okay to let the soap and water run over your incision. Do not scrub your incision. Dry the area and put on new, clean bandages as directed. Change your bandages when they get wet or dirty.     If you have surgical glue over your incision, this will start to flake off 7-10 days postoperatively. When this occurs, you can peel off the remaining glue.    Surgical glue reactions are common. If this occurs, please take a dose of Benadryl 25-50 mg every 4-6 hours. The maximum dose of Benadryl is 300mg/day. You may also apply over-the-counter hydrocortisone around the incision area (not on top of the incision).     Check your incision every day for redness, swelling, or pus.     Deep breathing:   Take deep breaths and cough 10 times each hour. This will decrease your risk of a lung infection as this will open your airway. Take a deep breath and hold it for as long as you can. Let the air out and then cough strongly.     You may be given an incentive spirometer to help you take deep breaths. Put the plastic piece in your mouth and take a slow, deep breath, then let the air out and cough.     Get support:   This surgery may be life-changing for you and your family. You will no longer be able to get pregnant. Sudden changes in the levels of your hormones may occur and cause mood swings and depression. You may feel angry, sad, frightened, or cry frequently and unexpectedly. These feelings are normal. Talk to your healthcare provider about where you can get support. You can also ask if hormone replacement medicine is right for you. Write down your questions so you remember to ask them during your visits.

## 2025-06-19 NOTE — ANESTHESIA POSTPROCEDURE EVALUATION
Post-Op Assessment Note    CV Status:  Stable  Pain Score: 0    Pain management: adequate       Mental Status:  Awake   Hydration Status:  Euvolemic and stable   PONV Controlled:  None   Airway Patency:  Patent     Post Op Vitals Reviewed: Yes    No anethesia notable event occurred.    Staff: CRNA, Anesthesiologist           Last Filed PACU Vitals:  Vitals Value Taken Time   Temp 97.1 °F (36.2 °C) 06/19/25 14:58   Pulse 75 06/19/25 15:02   /65 06/19/25 15:00   Resp 30 06/19/25 15:02   SpO2 93 % 06/19/25 15:02   Vitals shown include unfiled device data.    Modified Matthew:     Vitals Value Taken Time   Activity 2 06/19/25 14:58   Respiration 2 06/19/25 14:58   Circulation 2 06/19/25 14:58   Consciousness 1 06/19/25 14:58   Oxygen Saturation 1 06/19/25 14:58     Modified Matthew Score: 8

## 2025-06-19 NOTE — ANESTHESIA PREPROCEDURE EVALUATION
Procedure:  HYSTERECTOMY LAPAROSCOPIC TOTAL (LTH) W/ ROBOTICS, BILATERAL SALPINGO-OOPHORECTOMY, BILATERAL SENTINEL LYMPH NODE BIOPSY, STAGING, EXAM UNDER ANESTHESIA, ALL OTHER INDICATED PROCEDURES (Abdomen)    Relevant Problems   CARDIO   (+) Venous anomaly      GYN   (+) Endometrial carcinoma (HCC)        Physical Exam    Airway     Mallampati score: II  TM Distance: >3 FB  Neck ROM: full      Cardiovascular      Dental   No notable dental hx     Pulmonary      Neurological      Other Findings  post-pubertal.      Anesthesia Plan  ASA Score- 2     Anesthesia Type- general with ASA Monitors.         Additional Monitors:     Airway Plan: Oral ETT.           Plan Factors-Exercise tolerance (METS): >4 METS.    Chart reviewed. EKG reviewed. Imaging results reviewed. Existing labs reviewed. Patient summary reviewed.    Patient is not a current smoker.              Induction- intravenous.    Postoperative Plan- .   Monitoring Plan - Monitoring plan - standard ASA monitoring  Post Operative Pain Plan - multimodal analgesia        Informed Consent- Anesthetic plan and risks discussed with patient.  I personally reviewed this patient with the CRNA. Discussed and agreed on the Anesthesia Plan with the CRNA..      NPO Status:  Vitals Value Taken Time   Date of last liquid 06/18/25 06/19/25 10:59   Time of last liquid 2200 06/19/25 10:59   Date of last solid 06/18/25 06/19/25 10:59   Time of last solid 1800 06/19/25 10:59

## 2025-06-19 NOTE — OP NOTE
OPERATIVE REPORT  PATIENT NAME: Evelina Ford    :  1960  MRN: [id number]  Pt Location: BE OR ROOM 15    SURGERY DATE: 2025    Surgeons and Role:     * July Feliz MD - Primary     * Helen Hernández PA-C - Assisting     * Luci De La Torre MD - Assisting    Preop Diagnosis:  Endometrial carcinoma (HCC) [C54.1]    Post-Op Diagnosis Codes:     * Endometrial carcinoma (HCC) [C54.1]    Procedure(s):  HYSTERECTOMY LAPAROSCOPIC TOTAL (LTH) W/ ROBOTICS. BILATERAL SALPINGO-OOPHORECTOMY. BILATERAL SENTINEL LYMPH NODE MAPPING. RIGHT SENTINEL LYMPH NODE BIOPSY. LEFT PELVIC LYMPHADENECTOMY. OMENTAL BIOPSY. EXAM UNDER ANESTHESIA    Specimen(s):  ID Type Source Tests Collected by Time Destination   1 :  Washing Pelvic Washing NON-GYNECOLOGIC CYTOLOGY July Feliz MD 2025 1221    2 : left upper quad. perineum Tissue Perineum TISSUE EXAM July Feliz MD 2025 1223    3 : right pelvic sent. lymph node Tissue Lymph Node, [place] TISSUE EXAM July Feliz MD 2025 1306    4 : left pelvic lymph nodes Tissue Lymph Node TISSUE EXAM July Feliz MD 2025 1333    5 : uterus, cervix, b/l tubes and ovaries Tissue Uterus w/Bilateral Ovaries and Fallopian Tubes TISSUE EXAM July Feliz MD 2025 1403    6 : omental bx Tissue Omentum TISSUE EXAM July Feliz MD 2025 1405      Estimated Blood Loss:   20 mL    Drains:  Urethral Catheter Non-latex 16 Fr. (Active)   Number of days: 0       Anesthesia Type:   General    Operative Indications:  63yo with newly diagnosed serous endometrial carcinoma on D&C specimen. Preoperative CT CAP was negative for metastatic disease.    Operative Findings:  - 10cm uterus  - Several 2mm nodules in LUQ which were biopsied (frozen: benign fibroadipose tissue). No other evidence of disease on bilateral diaphragms, liver surface,stomach edge, omentum, or visualized bowels  - Normal appearing  uterus without disease visualized at serosal surface. Evidence of prior tubal ligation, bilateral fimbriated ends of tubes intact. Minimal physiologic adhesions between left adnexa and sigmoid. Normal appearing ovaries.  - Right sentinel lymph node mapped to external iliac vessels  - Left sentinel lymph node did not map, full left pelvic lymphadenectomy performed  - Ureters visualized vermiculating throughout case  - Surgicel used at vaginal cuff and bilateral retroperitoneums at end of case    Complications:   None    Procedure and Technique:  The patient was brought to the Operating Room where general anesthesia was induced. The abdomen, vagina and perineum were prepped and draped.  A Mane catheter was placed in a sterile fashion. Atraumatic vaginal retractors were placed to identify the cervix and complete the examination. 4cc of ICG were injected into the cervix with 1cc each at 3 o'clock and 9 o'clock a 1mm and 1cm depth. The cervix was grasped with a single tooth tenaculum. The cervix was easily dilated and the uterus sounded to 10 cm. A medium Vcare uterine manipulator was placed in the uterine cavity without difficulty.    An incision was made at Allen's point 3cm inferior to the costal margin in the mid-clavicular line and a 5mm trocar inserted on low carbon dioxide flow under direct visualization. The peritoneal cavity was then insufflated with carbon dioxide on high flow to maintain a pressure of 15 mm Hg throughout the case. An abdominopelvic survey was completed with the above findings. An incision was made 25 cm above the pubic symphysis, through which the midline robotic trocar was introduced into the abdominal cavity. Additional robotic ports were placed under direct visualization. The 5mm trocar at Allen's point was replaced by an 8mm AirSeal. Pelvic washings were obtained. There were several 2mm nodules visualized in left upper quadrant. The peritoneum adjacent to the nodules was grasped with a  laparoscopic Allis and a scissors was used to obtain a biopsy which was sent for frozen pathology. This resulted with benign fibroadipose tissue. Bovie was used for hemostasis at the biopsy site.    With the uterus on upward traction, the bilateral round ligaments were cauterized and incised. The peritoneum was incised in planes lateral and parallel to the ovarian vessels on both sides.  The ureters were then identified retroperitoneally, coursing well posterior to the ovarian vessels. The paravesical and obturator spaces were opened up. The Firely mode was activated on the camera. The right sentinel lymph nodes were identified at the right external iliac vessels. The sentinel lymph nodes here were carefully grasped and dissected from surrounding tissue taking care not to cause vascular or nerve injury. After dissection, Firefly was again activated to ensure that green hina tissue was identified. The nodes were removed through trocar sites. On the left, the sentinel lymph nodes did not map, thus full left pelvic lymphadenectomy was performed. The lymph tissue was grasped and removed from the genitofemoral nerve laterally, the circumflex vein anteriorly, the obturator nerve caudad, the superior vesical artery medially, and the ureter medially to remove the lymph tissue above the common iliac, external iliac, and obturator spaces. Care was taken to identify and preserve the obturator nerve, superior vesical artery, and ureter. The lymph nodes were placed in a 5mm Endocatch bag for later removal.    The ureters were again identified retroperitoneally, coursing well posterior to the ovarian vessels. The ovarian vessels were coagulated and divided proximally using bipolar cautery. The posterior peritoneum was dropped to the level of the green VCare ring. The vesico-uterine peritoneum was incised, and the bladder dissected from the cervix and upper vagina in a meticulous fashion to the level of the green ring. The  uterine vessels were then skeletonized bilaterally and coagulated at the level of the green ring using bipolar current, then divided. With the bladder mobilized anteriorly and the rectum well away posteriorly, using the monopolar device, an incision was made in the anterior upper vagina at the level of the cervicovaginal junction.  The incision was continued circumferentially around the upper vagina, controlling upper vaginal blood supply laterally using the biopolar. This allowed completely amputation of the specimen. The uterus, cervix, bilateral fallopian tubes and ovaries were then removed en bloc transvaginally. The left pelvic lymph nodes within the Endocatch bag were removed transvaginally. A laparotomy sponge was placed in the vagina for vaginal occlusion.    The upper vagina was then closed laparoscopically with Stratafix suture, taking care to avoid bladder injury. The sponge was removed from the vagina and a digital exam confirmed complete closure of the vaginal cuff. Surgicel was placed on the vaginal cuff and retroperitoneal areas with adequate hemostasis.    The robotic instruments were removed, and the robot undocked. The laparoscopic skin incisions were irrigated and made hemostatic using electrocoagulation. They were closed with subcuticular stitches of 4-0 monocryl.     All sponge, needle and instrument counts were correct x2.  Anesthesia was reversed and the patient was taken to the PACU in a stable condition.    I was present for the entire procedure. and A physician assistant was required during the procedure for retraction, tissue handling, dissection and suturing.    Patient Disposition:  PACU        SIGNATURE: July Feliz MD  DATE: June 19, 2025  TIME: 2:38 PM

## 2025-06-19 NOTE — INTERVAL H&P NOTE
H&P reviewed. After examining the patient I find no changes in the patients condition since the H&P had been written.    There were no vitals filed for this visit.    July Feliz MD, MPH  Division of Gynecologic Oncology  06/19/25 10:51 AM

## 2025-06-20 NOTE — QUICK NOTE
Called patient POD#1 to review surgery and intraoperative findings.    Patient meeting early postop milestones - pain adequately controlled, voiding spontaneously, ambulating, no nausea/emesis, no fevers/chills.     Patient instructed to call with any questions or concerns. POV scheduled.

## 2025-06-23 NOTE — ANESTHESIA POSTPROCEDURE EVALUATION
Post-Op Assessment Note            No anethesia notable event occurred.            Last Filed PACU Vitals:  Vitals Value Taken Time   Temp 97.1 °F (36.2 °C) 06/19/25 14:58   Pulse 82 06/19/25 15:50   /59 06/19/25 15:45   Resp 34 06/19/25 15:50   SpO2 99 % 06/19/25 15:50   Vitals shown include unfiled device data.    Modified Matthew:     Vitals Value Taken Time   Activity 2 06/19/25 15:45   Respiration 2 06/19/25 15:45   Circulation 2 06/19/25 15:45   Consciousness 1 06/19/25 15:45   Oxygen Saturation 1 06/19/25 15:45     Modified Matthew Score: 8

## 2025-06-30 PROCEDURE — 88309 TISSUE EXAM BY PATHOLOGIST: CPT | Performed by: PATHOLOGY

## 2025-06-30 PROCEDURE — 88342 IMHCHEM/IMCYTCHM 1ST ANTB: CPT | Performed by: PATHOLOGY

## 2025-06-30 PROCEDURE — 88305 TISSUE EXAM BY PATHOLOGIST: CPT | Performed by: PATHOLOGY

## 2025-06-30 PROCEDURE — 88112 CYTOPATH CELL ENHANCE TECH: CPT | Performed by: PATHOLOGY

## 2025-06-30 PROCEDURE — 88307 TISSUE EXAM BY PATHOLOGIST: CPT | Performed by: PATHOLOGY

## 2025-06-30 PROCEDURE — 88341 IMHCHEM/IMCYTCHM EA ADD ANTB: CPT | Performed by: PATHOLOGY

## 2025-06-30 PROCEDURE — 88344 IMHCHEM/IMCYTCHM EA MLT ANTB: CPT | Performed by: PATHOLOGY

## 2025-07-01 ENCOUNTER — DOCUMENTATION (OUTPATIENT)
Dept: HEMATOLOGY ONCOLOGY | Facility: CLINIC | Age: 65
End: 2025-07-01

## 2025-07-01 NOTE — PROGRESS NOTES
In-basket message received from Dr. Feliz to add patient to the gyn [facility]CC on 7/7/2025. Chart reviewed and prep completed.

## 2025-07-07 ENCOUNTER — DOCUMENTATION (OUTPATIENT)
Dept: GYNECOLOGIC ONCOLOGY | Facility: CLINIC | Age: 65
End: 2025-07-07

## 2025-07-07 NOTE — PROGRESS NOTES
Multidisciplinary Gynecologic Oncology Tumor Case Review       Physician Recommended Plan     Evelina Ford is a 64 y.o. 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 The group recommended to consider treatment with adjuvant chemotherapy with vaginal brachytherapy and referral to genetics.     Follow-up appointment with Dr. Feliz on 7/11/25.     NCCN guidelines were available for review.     The final treatment plan will be left to the discretion of the patient and the treating physician.       DISCLAIMERS:    TO THE TREATING PHYSICIAN:  This conference is a meeting of clinicians from various specialty areas who evaluate and discuss patients for whom a multidisciplinary treatment approach is being considered. Please note that the above opinion was a consensus of the conference attendees and is intended only to assist in quality care of the discussed patient.  The responsibility for follow up on the input given during the conference, along with any final decisions regarding plan of care, is that of the patient and the patient's provider. Accordingly, appointments have only been recommended based on this information and have NOT been scheduled unless otherwise noted.      TO THE PATIENT:  This summary is a brief record of major aspects of your cancer treatment. You may choose to share a copy with any of your doctors or nurses. However, this is not a detailed or comprehensive record of your care.

## 2025-07-09 PROCEDURE — 88341 IMHCHEM/IMCYTCHM EA ADD ANTB: CPT | Performed by: PATHOLOGY

## 2025-07-10 PROBLEM — N84.0 ENDOMETRIAL POLYP: Status: RESOLVED | Noted: 2025-06-02 | Resolved: 2025-07-10

## 2025-07-10 PROBLEM — Z90.710 S/P HYSTERECTOMY WITH OOPHORECTOMY: Status: ACTIVE | Noted: 2025-07-10

## 2025-07-10 PROBLEM — Z90.721 S/P HYSTERECTOMY WITH OOPHORECTOMY: Status: ACTIVE | Noted: 2025-07-10

## 2025-07-10 PROBLEM — Z98.890 S/P DILATATION AND CURETTAGE: Status: RESOLVED | Noted: 2025-06-02 | Resolved: 2025-07-10

## 2025-07-10 NOTE — ASSESSMENT & PLAN NOTE
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 We discussed indications for adjuvant therapy.  We discussed that her case is a bit equivocal because of the atypical cells in her washings which were not definitively positive for malignancy; with negative washings she could likely just have adjuvant vaginal cuff brachytherapy, while positive pelvic washings for malignancy would have chemotherapy recommended in addition to vaginal cuff brachytherapy per [facility]. Her case was presented at [facility] multidisciplinary tumor board with consensus recommendation for chemo and vaginal cuff brachytherapy. We discussed that without any treatment the 5-year disease-free survival with her stage and histology of cancer is 70% and 5-year cancer specific survival is 90%.  We discussed that although this is a rare cancer, multiple studies have demonstrated improved disease-free and overall survival with the addition of adjuvant radiation and/or chemotherapy.     After discussion above, patient desires to proceed with adjuvant chemotherapy and vaginal cuff brachytherapy.  - Plan for carboplatin AUC 6 / paclitaxel 175 mg/m2 IV q3 weeks x 6 cycles  - We discussed in depth the schedule, logistics, and side effect profile of carboplatin and paclitaxel   - Signed consents for carboplatin / paclitaxel   - Patient does not desire port  - Referral placed to radiation oncology to be completed either when cuff healed or at completion of chemotherapy  - Patient has travel planned from July 31-Sept 4. We will plan to administer C1 prior to her travel and she will travel back to [place] for C2; weekly labs will be obtained locally and faxed to [facility].      Referral placed to oncology genetics.  Will plan to discuss ctDNA at future visit.    Orders:    Ambulatory Referral to Genetics; Future    Ambulatory Referral to Radiation Oncology; Future

## 2025-07-10 NOTE — PROGRESS NOTES
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 We discussed indications for adjuvant therapy.  We discussed that her case is a bit equivocal because of the atypical cells in her washings which were not definitively positive for malignancy; with negative washings she could likely just have adjuvant vaginal cuff brachytherapy, while positive pelvic washings for malignancy would have chemotherapy recommended in addition to vaginal cuff brachytherapy per [facility]. Her case was presented at [facility]'s multidisciplinary tumor board with consensus recommendation for chemo and vaginal cuff brachytherapy. We discussed that without any treatment the 5-year disease-free survival with her stage and histology of cancer is 70% and 5-year cancer specific survival is 90%.  We discussed that although this is a rare cancer, multiple studies have demonstrated improved disease-free and overall survival with the addition of adjuvant radiation and/or chemotherapy.     After discussion above, patient desires to proceed with adjuvant chemotherapy and vaginal cuff brachytherapy.  - Plan for carboplatin AUC 6 / paclitaxel 175 mg/m2 IV q3 weeks x 6 cycles  - We discussed in depth the schedule, logistics, and side effect profile of carboplatin and paclitaxel   - Signed consents for carboplatin / paclitaxel   - Patient does not desire port  - Referral placed to radiation oncology to be completed  either when cuff healed or at completion of chemotherapy  - Patient has travel planned from July 31-Sept 4. We will plan to administer C1 prior to her travel and she will travel back to [place] for C2; weekly labs will be obtained locally and faxed to [facility].     Referral placed to oncology genetics.  Will plan to discuss ctDNA at future visit.    Orders:    Ambulatory Referral to Genetics; Future    Ambulatory Referral to Radiation Oncology; Future    S/P hysterectomy with oophorectomy  - Reviewed surgical pathology as above  - Abdomen soft/NT/ND, trocar sites healing well  - Patient meeting appropriate postop milestones  - Continue lifting restrictions x4 weeks  - Continue pelvic restrictions until cleared   - Rtc in ~4 weeks for final postop visit. Will coordinate with C2 chemo administration when she returns from her planned vacation         Type 2 diabetes mellitus without complication, without long-term current use of insulin (HCC)    Lab Results   Component Value Date    HGBA1C 7.0 (H) 06/13/2025   - Preop HgbA1c 7.0%  - Diagnosis of DM discussed with patient preoperatively  - Patient to follow-up with PCP for management             History of Present Illness   Reason for Visit / CC:  Postop with treatment planning    Evelina Ford is a 64 y.o. female with stage IA p53m MMRp [id number] high grade serous endometrial carcinoma s/p uncomplicated RATLH/BSO/R SLNBx/L PLND/omental biopsy/washings on 6/19.     From a postop perspective, she reports that pain is adequately controlled without adjunct pain medication and she never required oxycodone. Tolerating PO without nausea/emesis. Urinating without issues. Having bowel function without significant diarrhea/constipation. Ambulating and returning to normal activities. Denies vaginal bleeding/discharge. Following lifting and pelvic restrictions.        Review of Systems A complete review of systems is negative other than that noted above in the HPI.  Medical History  Reviewed by provider this encounter:  Tobacco  Allergies  Meds  Problems  Med Hx  Surg Hx  Fam Hx     .     Objective   /82 (BP Location: Left arm, Patient Position: Sitting, Cuff Size: Large)   Pulse 99   Temp 98.1 °F (36.7 °C) (Temporal)   Wt 87.5 kg (193 lb)   SpO2 99%   BMI 36.47 kg/m²     Body mass index is 36.47 kg/m².  Pain Screening:  Pain Score: 0-No pain  ECOG ECOG Performance Status: 0 - Fully active, able to carry on all pre-disease performance without restriction   Physical Exam  Vitals reviewed.   Constitutional:       General: She is not in acute distress.     Appearance: Normal appearance.   HENT:      Head: Normocephalic and atraumatic.      Mouth/Throat:      Mouth: Mucous membranes are moist.   Pulmonary:      Effort: Pulmonary effort is normal.      Breath sounds: Normal breath sounds.   Abdominal:      Palpations: Abdomen is soft. There is no mass.      Tenderness: There is no abdominal tenderness.     Skin:     General: Skin is warm and dry.      Comments: Surgical trocar sites are intact, clean and dry without induration, erythema or purulent drainage.      Neurological:      Mental Status: She is alert and oriented to person, place, and time.     Psychiatric:         Mood and Affect: Mood normal.         Behavior: Behavior normal.         Thought Content: Thought content normal.         Judgment: Judgment normal.          Labs: I have reviewed pertinent labs.   Lab Results   Component Value Date/Time     5.1 06/13/2025 04:57 PM     Lab Results   Component Value Date/Time    Potassium 4.0 06/13/2025 04:57 PM    Chloride 105 06/13/2025 04:57 PM    CO2 29 06/13/2025 04:57 PM    BUN 20 06/13/2025 04:57 PM    Creatinine 0.72 06/13/2025 04:57 PM    Calcium 9.4 06/13/2025 04:57 PM    eGFR 89 06/13/2025 04:57 PM     Lab Results   Component Value Date/Time    WBC 8.28 06/13/2025 04:57 PM    Hemoglobin 12.7 06/13/2025 04:57 PM    Hematocrit 38.3 06/13/2025 04:57 PM    MCV 89  "06/13/2025 04:57 PM    Platelets 264 06/13/2025 04:57 PM     No results found for: \"NEUTROABS\"     Trend:  Lab Results   Component Value Date     5.1 06/13/2025       Radiology Results Review : No pertinent imaging studies reviewed.  Other Study Results Review : Pathology reports reviewed.    Administrative Statements   I have spent a total time of 30 minutes in caring for this patient on the day of the visit/encounter including Diagnostic results, Prognosis, Risks and benefits of tx options, and Patient and family education.    July Feliz MD, MPH  Division of Gynecologic Oncology  07/13/25 8:40 PM    "

## 2025-07-10 NOTE — ASSESSMENT & PLAN NOTE
- Reviewed surgical pathology as above  - Abdomen soft/NT/ND, trocar sites healing well  - Patient meeting appropriate postop milestones  - Continue lifting restrictions x4 weeks  - Continue pelvic restrictions until cleared   - Rtc in ~4 weeks for final postop visit. Will coordinate with C2 chemo administration when she returns from her planned vacation

## 2025-07-11 ENCOUNTER — OFFICE VISIT (OUTPATIENT)
Dept: GYNECOLOGIC ONCOLOGY | Facility: CLINIC | Age: 65
End: 2025-07-11

## 2025-07-11 ENCOUNTER — TELEPHONE (OUTPATIENT)
Dept: HEMATOLOGY ONCOLOGY | Facility: CLINIC | Age: 65
End: 2025-07-11

## 2025-07-11 ENCOUNTER — TELEPHONE (OUTPATIENT)
Dept: GYNECOLOGIC ONCOLOGY | Facility: CLINIC | Age: 65
End: 2025-07-11

## 2025-07-11 VITALS
BODY MASS INDEX: 36.47 KG/M2 | SYSTOLIC BLOOD PRESSURE: 150 MMHG | HEART RATE: 99 BPM | WEIGHT: 193 LBS | OXYGEN SATURATION: 99 % | DIASTOLIC BLOOD PRESSURE: 82 MMHG | TEMPERATURE: 98.1 F

## 2025-07-11 DIAGNOSIS — C54.1 ENDOMETRIAL CARCINOMA (HCC): Primary | ICD-10-CM

## 2025-07-11 DIAGNOSIS — E11.9 TYPE 2 DIABETES MELLITUS WITHOUT COMPLICATION, WITHOUT LONG-TERM CURRENT USE OF INSULIN (HCC): ICD-10-CM

## 2025-07-11 DIAGNOSIS — Z90.721 S/P HYSTERECTOMY WITH OOPHORECTOMY: ICD-10-CM

## 2025-07-11 DIAGNOSIS — Z90.710 S/P HYSTERECTOMY WITH OOPHORECTOMY: ICD-10-CM

## 2025-07-11 PROCEDURE — 99024 POSTOP FOLLOW-UP VISIT: CPT | Performed by: OBSTETRICS & GYNECOLOGY

## 2025-07-11 RX ORDER — ONDANSETRON 8 MG/1
8 TABLET, FILM COATED ORAL EVERY 8 HOURS PRN
Qty: 30 TABLET | Refills: 1 | Status: SHIPPED | OUTPATIENT
Start: 2025-07-11 | End: 2025-07-14 | Stop reason: ALTCHOICE

## 2025-07-11 RX ORDER — LORAZEPAM 1 MG/1
1 TABLET ORAL EVERY 8 HOURS PRN
Qty: 20 TABLET | Refills: 0 | Status: SHIPPED | OUTPATIENT
Start: 2025-07-11

## 2025-07-11 NOTE — TELEPHONE ENCOUNTER
Called and spoke to patient about scheduling appointment for cuff check with Dr. Feliz.  Patient stated that she will be leaving on 7/31 to 8/15 to [place], returning for chemo on 8/15, and then going back to [place] from 8/16 to arriving home late on 9/4.  Patient asked why can't the provider just do a virtual when she is away. Explained to her that if the provider does not have a license in that state the provider can not treat her as a virtual appointment.  Told patient that I will talk to the provider and call her back.       Called patient back about appointment.  Told her that per Dr. Feliz, nuzhat will work on a schedule for the precycle appointments.  Patient stated that she has medication sent to the pharmacy that she does not know why.  Per Jerri, they are for after the chemo and to start with Zofran and then add other medication if needed.  Patient will wait for paperwork for the appointments and per Dr. Feliz ok to schedule the Cuff check on the next in person visit but patient needs to be on pelvic rest until cuff check.  Patient gave her verbal understanding.

## 2025-07-11 NOTE — TELEPHONE ENCOUNTER
Called patient to schedule f/u appt .     Patient did not understand very well when the doctor wants to see her . She is saying that the  Told her that next visit will be virtual appt  , but she is not home in [place] , she is vacationing in  [place] .  I tried to explain to her that we can't do virtual if patient is not in state .     Patient was called by July Mario MA and scheduled f/u appt

## 2025-07-13 PROBLEM — E11.9 TYPE 2 DIABETES MELLITUS WITHOUT COMPLICATION (HCC): Status: ACTIVE | Noted: 2025-07-13

## 2025-07-14 ENCOUNTER — TELEPHONE (OUTPATIENT)
Age: 65
End: 2025-07-14

## 2025-07-14 ENCOUNTER — DOCUMENTATION (OUTPATIENT)
Dept: GYNECOLOGIC ONCOLOGY | Facility: CLINIC | Age: 65
End: 2025-07-14

## 2025-07-14 ENCOUNTER — DOCUMENTATION (OUTPATIENT)
Dept: HEMATOLOGY ONCOLOGY | Facility: CLINIC | Age: 65
End: 2025-07-14

## 2025-07-14 DIAGNOSIS — C54.1 ENDOMETRIAL CARCINOMA (HCC): Primary | ICD-10-CM

## 2025-07-14 RX ORDER — METOCLOPRAMIDE 5 MG/1
5 TABLET ORAL 3 TIMES DAILY PRN
Qty: 20 TABLET | Refills: 1 | Status: SHIPPED | OUTPATIENT
Start: 2025-07-14

## 2025-07-14 NOTE — ASSESSMENT & PLAN NOTE
Lab Results   Component Value Date    HGBA1C 7.0 (H) 06/13/2025   - Preop HgbA1c 7.0%  - Diagnosis of DM discussed with patient preoperatively  - Patient to follow-up with PCP for management

## 2025-07-14 NOTE — PROGRESS NOTES
July 2025 Sunday Monday Tuesday Wednesday Thursday Friday Saturday             1     2     3     4     5                6     7     8     9     10     11    POST OP   1:45 PM   July Feliz MD   [facility] [place] 12                13     14     15     16     17     18     19                20     21     22     23    Labs 24     25    Oncology Treatment   9:00 AM   AN INF CHAIR 5   [facility] 26            Cycle 1, Day 1    27     28     29     30    Labs 31    VACATION                                   August 2025 Sunday Monday Tuesday Wednesday Thursday Friday Saturday                            1     2                3    VACATION 4     5     6    Labs 7     8    VIRTUAL VISIT   1:30 PM   July Feliz MD   [facility] [place] 9                10    VACATION 11     12     13    Labs 14     15    Oncology Treatment   8:00 AM   AN INF BED 23   [facility] 16            Cycle 2, Day 1    17    VACATION 18     19     20    Labs 21     22     23                24    VACATION 25     26     27    Labs 28     29     30                31    VACATION                                                       September 2025 Sunday Monday Tuesday Wednesday Thursday Friday Saturday        1    Vacation 2    Vacation    VIRTUAL VISIT   1:30 PM   July Feliz MD   [facility] [place] 3    Vacation    Labs 4    Vacation 5    Oncology Treatment   8:00 AM   AN INF CHAIR 13   [facility] 6            Cycle 3, Day 1    7     8     9     10    Labs 11     12     13                14     15     16     17    Labs 18     19     20                21     22     23     24      Labs 25    Office Visit   9:00 AM   July Feliz MD   [facility] [place] 26    Oncology Treatment   8:00 AM   AN  INF CHAIR 2   [facility] 27            Cycle 4, Day 1    28     29 30 October 2025 Sunday Monday Tuesday Wednesday Thursday Friday Saturday                  1    Labs 2     3     4                5     6     7     8    Labs 9     10     11                12     13     14     15    Labs 16    Office Visit   9:30 AM   July Feliz MD   [facility] [place] [id number]    Oncology Treatment   8:00 AM   AN INF CHAIR 2   [facility] 18            Cycle 5, Day 1    19     20     21     22    Labs 23     24     25                26     27     28     29    Labs 30     31

## 2025-07-14 NOTE — PROGRESS NOTES
Referral from GYN ONC to RAD ONC for brachy therapy with cylinder s/p RATLH- endometrial cancer diagnosis.

## 2025-07-14 NOTE — TELEPHONE ENCOUNTER
Patient is calling regarding her Zofran prescription, she stated that the cost is over 200 dollars.  She was wondering if there was something else that could be called in.

## 2025-07-14 NOTE — TELEPHONE ENCOUNTER
Attempted to return call to the patient for follow up. I left a message on her vm to return call to discuss with a nurse and I explained will send her request to the office. Hope line number left on  for call back.

## 2025-07-15 ENCOUNTER — TELEPHONE (OUTPATIENT)
Age: 65
End: 2025-07-15

## 2025-07-23 ENCOUNTER — CONSULT (OUTPATIENT)
Dept: RADIATION ONCOLOGY | Facility: HOSPITAL | Age: 65
End: 2025-07-23
Attending: OBSTETRICS & GYNECOLOGY
Payer: COMMERCIAL

## 2025-07-23 ENCOUNTER — TELEPHONE (OUTPATIENT)
Dept: INFUSION CENTER | Facility: CLINIC | Age: 65
End: 2025-07-23

## 2025-07-23 ENCOUNTER — APPOINTMENT (OUTPATIENT)
Dept: LAB | Facility: CLINIC | Age: 65
End: 2025-07-23
Payer: COMMERCIAL

## 2025-07-23 VITALS
DIASTOLIC BLOOD PRESSURE: 78 MMHG | SYSTOLIC BLOOD PRESSURE: 120 MMHG | OXYGEN SATURATION: 99 % | TEMPERATURE: 97.8 F | RESPIRATION RATE: 18 BRPM | HEART RATE: 81 BPM

## 2025-07-23 DIAGNOSIS — C54.1 ENDOMETRIAL CARCINOMA (HCC): ICD-10-CM

## 2025-07-23 LAB
ALBUMIN SERPL BCG-MCNC: 4.5 G/DL (ref 3.5–5)
ALP SERPL-CCNC: 78 U/L (ref 34–104)
ALT SERPL W P-5'-P-CCNC: 10 U/L (ref 7–52)
ANION GAP SERPL CALCULATED.3IONS-SCNC: 5 MMOL/L (ref 4–13)
AST SERPL W P-5'-P-CCNC: 10 U/L (ref 13–39)
BASOPHILS # BLD AUTO: 0.03 THOUSANDS/ÂΜL (ref 0–0.1)
BASOPHILS NFR BLD AUTO: 1 % (ref 0–1)
BILIRUB SERPL-MCNC: 0.31 MG/DL (ref 0.2–1)
BUN SERPL-MCNC: 20 MG/DL (ref 5–25)
CALCIUM SERPL-MCNC: 9.6 MG/DL (ref 8.4–10.2)
CHLORIDE SERPL-SCNC: 106 MMOL/L (ref 96–108)
CO2 SERPL-SCNC: 28 MMOL/L (ref 21–32)
CREAT SERPL-MCNC: 0.77 MG/DL (ref 0.6–1.3)
EOSINOPHIL # BLD AUTO: 0.07 THOUSAND/ÂΜL (ref 0–0.61)
EOSINOPHIL NFR BLD AUTO: 1 % (ref 0–6)
ERYTHROCYTE [DISTWIDTH] IN BLOOD BY AUTOMATED COUNT: 13.1 % (ref 11.6–15.1)
GFR SERPL CREATININE-BSD FRML MDRD: 82 ML/MIN/1.73SQ M
GLUCOSE P FAST SERPL-MCNC: 119 MG/DL (ref 65–99)
HCT VFR BLD AUTO: 39 % (ref 34.8–46.1)
HGB BLD-MCNC: 12.9 G/DL (ref 11.5–15.4)
IMM GRANULOCYTES # BLD AUTO: 0.03 THOUSAND/UL (ref 0–0.2)
IMM GRANULOCYTES NFR BLD AUTO: 1 % (ref 0–2)
LYMPHOCYTES # BLD AUTO: 2.03 THOUSANDS/ÂΜL (ref 0.6–4.47)
LYMPHOCYTES NFR BLD AUTO: 36 % (ref 14–44)
MAGNESIUM SERPL-MCNC: 2 MG/DL (ref 1.9–2.7)
MCH RBC QN AUTO: 29.4 PG (ref 26.8–34.3)
MCHC RBC AUTO-ENTMCNC: 33.1 G/DL (ref 31.4–37.4)
MCV RBC AUTO: 89 FL (ref 82–98)
MONOCYTES # BLD AUTO: 0.47 THOUSAND/ÂΜL (ref 0.17–1.22)
MONOCYTES NFR BLD AUTO: 8 % (ref 4–12)
NEUTROPHILS # BLD AUTO: 2.98 THOUSANDS/ÂΜL (ref 1.85–7.62)
NEUTS SEG NFR BLD AUTO: 53 % (ref 43–75)
NRBC BLD AUTO-RTO: 0 /100 WBCS
PLATELET # BLD AUTO: 218 THOUSANDS/UL (ref 149–390)
PMV BLD AUTO: 9.9 FL (ref 8.9–12.7)
POTASSIUM SERPL-SCNC: 4 MMOL/L (ref 3.5–5.3)
PROT SERPL-MCNC: 7 G/DL (ref 6.4–8.4)
RBC # BLD AUTO: 4.39 MILLION/UL (ref 3.81–5.12)
SODIUM SERPL-SCNC: 139 MMOL/L (ref 135–147)
WBC # BLD AUTO: 5.61 THOUSAND/UL (ref 4.31–10.16)

## 2025-07-23 PROCEDURE — 80053 COMPREHEN METABOLIC PANEL: CPT

## 2025-07-23 PROCEDURE — 85025 COMPLETE CBC W/AUTO DIFF WBC: CPT

## 2025-07-23 PROCEDURE — 99211 OFF/OP EST MAY X REQ PHY/QHP: CPT | Performed by: STUDENT IN AN ORGANIZED HEALTH CARE EDUCATION/TRAINING PROGRAM

## 2025-07-23 PROCEDURE — 83735 ASSAY OF MAGNESIUM: CPT

## 2025-07-23 PROCEDURE — 36415 COLL VENOUS BLD VENIPUNCTURE: CPT

## 2025-07-23 PROCEDURE — 99205 OFFICE O/P NEW HI 60 MIN: CPT | Performed by: STUDENT IN AN ORGANIZED HEALTH CARE EDUCATION/TRAINING PROGRAM

## 2025-07-23 NOTE — PROGRESS NOTES
Evelina Ford 1960 is a 64 y.o. female referred by Dr. Feliz for endometrial cancer to discuss vaginal brachytherapy.     Patient presented to her OB-Gyn in April 2025 with post menopausal bleeding x 3 months, US ordered revealing thickened endometrium. She had D&C/endometrial biopsy on 6/2/25 showing high grade adenocarcinoma, favor serous carcinoma. She was referred to Gyn Onc and is s/p total robotic-assisted laparoscopic hysterectomy, bilateral salpingo-oophorectomy, and sentinel lymph node mapping and biopsy on 6/19/25 (Dr. Feliz).  Her case was reviewed at Gyn Onc [facility] on 7/7/25 with recommendation for carbo/taxol x 6 cycles and vaginal cuff brachytherapy. She is scheduled for cycle 1 on 7/25/25.       4/22/25 US pelvis complete w transvaginal   Thickened heterogeneous endometrium measuring 19 mm in this postmenopausal patient. Recommend endometrial biopsy       6/2/25 Biopsy  A. Polyp, Cervical/Endometrial, Endometrial Polyp:  High grade adenocarcinoma, favor serous carcinoma  Attached / detached  fragments of endometrial polyp     B. Endometrium, EMC:  Fragments of high grade adenocarcinoma and fragments of endometrial polyp intermixed with blood      6/13/25 Gyn Onc, Dr. Feliz  Patient desires to proceed with total robotic-assisted laparoscopic hysterectomy, bilateral salpingo-oophorectomy, and sentinel lymph node mapping and biopsy, staging, all other indicated procedures.   Pre-op CT ordered.       6/14/25 CT chest abdomen pelvis w contrast  No signs of metastatic disease in the chest, abdomen or pelvis in this patient with known endometrial carcinoma.       6/19/25 Total Hysterectomy & BSO, omenectomy, peritoneal biopsy and washing  Procedure  Total hysterectomy and bilateral salpingo-oophorectomy     Omentectomy     Peritoneal biopsy(ies)     Peritoneal washing   Hysterectomy Type  Laparoscopic, robotic-assisted   TUMOR   Tumor Site  Endometrial polyp   Tumor Size  Greatest  Dimension (Centimeters): 0.9 cm   Additional Dimension (Centimeters)  0.7 cm     0.7 cm   Histologic Type  Serous carcinoma   Adenomyosis  Present, uninvolved by carcinoma   Uterine Serosa Involvement  Not identified   Lower Uterine Segment Involvement  Not identified   Cervical Stromal Involvement  Not identified   Other Tissue / Organ Involvement  Not identified   The International System for Reporting Serous Fluid Cytopathology  Atypia of undetermined significance (AUS): Scant atypical glandular cells   Lymphatic and / or Vascular Invasion  Present     Extensive / substantial (greater than or equal to 5 vessel involvement)   MARGINS   Margin Status  All margins negative for invasive carcinoma   REGIONAL LYMPH NODES   Regional Lymph Node Status  All regional lymph nodes negative for tumor cells   Lymph Nodes Examined     Total Number of Pelvic Nodes Examined  7   Number of Pelvic [place] Nodes Examined  2   Total Number of Para-aortic Nodes Examined  0   pTNM CLASSIFICATION (AJCC 8th Edition)   Reporting of pT, pN, and (when applicable) pM categories is based on information available to the pathologist at the time the report is issued. As per the AJCC (Chapter 1, 8th Ed.) it is the managing physician’s responsibility to establish the final pathologic stage based upon all pertinent information, including but potentially not limited to this pathology report.   pT Category  pT1a   pN Category  pN0   N Suffix  (sn)   FIGO STAGE   FIGO Stage  IA1   FIGO Modified Classification  [id number]   ADDITIONAL FINDINGS   Additional Findings  Leiomyoma       7/7/25 Multidisciplinary Gynecologic Oncology Tumor Case Review    The group recommended to consider treatment with adjuvant chemotherapy with vaginal brachytherapy and referral to genetics.       7/11/25 Dr. 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 Refer to Radiation Oncology for vaginal cuff brachytherapy.      Upcomin25 Infusion Cycle 1  25 Dr. Feliz  8/15/25 Infusion Cycle 2  25 Infusion Cycle 3  25 Infusion Cycle 4  10/17/25 Cycle 5  25 Cycle 6          Oncology History   Endometrial carcinoma (HCC)   2025 Initial Diagnosis    Endometrial carcinoma (HCC)     2025 Biopsy    A. Polyp, Cervical/Endometrial, Endometrial Polyp:  High grade adenocarcinoma, favor serous carcinoma  Attached / detached  fragments of endometrial polyp     B. Endometrium, EMC:  Fragments of high grade adenocarcinoma and fragments of endometrial polyp intermixed with blood     2025 Surgery    A. Perineum, left upper quadrant perineum, Biopsy  - Benign fibroadipose tissue.       B. Lymph Node, Right pelvic sentinel lymph node, Excision:  - Two lymph nodes (0/2), negative for carcinoma, supported by CK-AE1/AE3 immunostain.      C. Lymph Node, Left pelvic lymph nodes, Excision:  - Five lymph nodes (0/5), negative for carcinoma, supported by CK-AE1/AE3 immunostain.       D.  Uterus and bilateral ovaries and fallopian tubes, Hysterectomy and bilateral salpingo-oophorectomy:  - High grade serous carcinoma, arising in an endometrial polyp.   * Lymphovascular invasion: Present.   * Pathologic stage (AJCC, 8th ed.): pT1a, pN0(sn), FIGO stage IA1 ([id number]), see synoptic report.  - Extensive adenomyosis.  - Leiomyoma.  - Unremarkable ovaries and fallopian tubes.     E. Omentum, Biopsy:  - Benign adipose tissue.      7/10/2025 -  Cancer Staged    Staging form: Corpus Uteri - Carcinoma, AJCC 8th Edition  - Clinical: FIGO Stage IA (cT1a, cN0(sn), cM0) - Signed by July Feliz MD on 7/10/2025  Histopathologic type: Serous cystadenocarcinoma, NOS  Method  of lymph node assessment: [place] lymph node biopsy  Lymph-vascular invasion (LVI): LVI present/identified, NOS  Omentectomy performed: Yes       2025 -  Chemotherapy    CARBOplatin (PARAPLATIN) IVPB (GOG AUC DOSING), , 0 of 6 cycles  PACLItaxel (TAXOL) chemo IVPB, 175 mg/m2 = 325.8 mg, 0 of 6 cycles         Review of Systems:  Review of Systems   Constitutional: Negative.    HENT: Negative.     Eyes: Negative.    Respiratory: Negative.     Cardiovascular: Negative.    Gastrointestinal:  Negative for constipation and diarrhea.   Endocrine: Negative.    Genitourinary: Negative.  Negative for pelvic pain, vaginal bleeding and vaginal discharge.   Musculoskeletal: Negative.    Skin: Negative.    Allergic/Immunologic: Negative.    Neurological: Negative.    Hematological: Negative.    Psychiatric/Behavioral: Negative.         Clinical Trial: no    OB/GYN History:  The patient underwent menarche at 17 years  Menopause Status Post  No LMP recorded. Patient has had a hysterectomy.  Menopause at early 50's  Menopause Reason natural  Hormone replacement therapy: no.     3   Para 3   Birth control pills: yes.  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 completed    Implantable Devices (Port, Pacemaker, pain stimulator): no    Hip Replacement: no      Health Maintenance   Topic Date Due    Hepatitis C Screening  Never done    Kidney Health Evaluation: Albumin/Creatinine Ratio  Never done    Diabetic Foot Exam  Never done    Diabetic Eye Exam  Never done    HIV Screening  Never done    BMI: Followup Plan  Never done    Pneumococcal Vaccine: 50+ Years (1 of 2 - PCV) Never done    Zoster Vaccine (1 of 2) Never done    RSV Vaccine for Pregnant Patients and Patients Age 60+ Years (1 - Risk 60-74 years 1-dose series) Never done    COVID-19 Vaccine (3 - 2024- season) 2024     Influenza Vaccine (1) 09/01/2025    HEMOGLOBIN A1C  12/13/2025    Breast Cancer Screening: Mammogram  04/14/2026    Annual Physical  04/16/2026    Colorectal Cancer Screening  04/22/2026    BMI: Adult  07/11/2026    Depression Screening  07/23/2026    Kidney Health Evaluation: GFR  07/23/2026    DTaP,Tdap,and Td Vaccines (2 - Td or Tdap) 06/02/2033    Meningococcal B Vaccine  Aged Out    RSV Vaccine age 0-20 Months  Aged Out    HIB Vaccine  Aged Out    IPV Vaccine  Aged Out    Hepatitis A Vaccine  Aged Out    Meningococcal ACWY Vaccine  Aged Out    HPV Vaccine  Aged Out     Past Medical History[1]  Past Surgical History[2]  Family History[3]  Social History[4]   Current Medications[5]  Allergies[6]   Vitals:    07/23/25 0954   BP: 120/78   BP Location: Left arm   Pulse: 81   Resp: 18   Temp: 97.8 °F (36.6 °C)   TempSrc: Temporal   SpO2: 99%               [1]   Past Medical History:  Diagnosis Date    Endometrial polyp 06/02/2025    No pertinent past medical history     S/P dilatation and curettage 06/02/2025   [2]   Past Surgical History:  Procedure Laterality Date    HERNIA REPAIR  1992    naval hernia    VT HYSTEROSCOPY BX ENDOMETRIUM&/POLYPC W/WO D&C N/A 06/02/2025    Procedure: EXAM UNDER ANESTHESIA; DILATATION AND CURETTAGE (D&C) WITH HYSTEROSCOPY. RESECTION OF UTERINE PATHOLOGY.;  Surgeon: Ana Florez DO;  [facility];  Service: Gynecology    VT LAPS TOTAL HYSTERECT 250 GM/< W/RMVL TUBE/OVARY N/A 6/19/2025    Procedure: HYSTERECTOMY LAPAROSCOPIC TOTAL (LTH) W/ ROBOTICS, BILATERAL SALPINGO-OOPHORECTOMY, BILATERAL SENTINEL LYMPH NODE MAPPING, RIGHT SENTINEL LYMPH NODE BIOPSY, LEFT PELVIC LYMPHADENECTOMY, OMENTAL BIOPSY, EXAM UNDER ANESTHESIA;  Surgeon: July Feliz MD;  Location: [facility];  Service: Gynecology Oncology    TUBAL LIGATION  1992   [3]   Family History  Problem Relation Name Age of Onset    Diabetes Mother Mother     Breast cancer Neg Hx      Colon cancer Neg Hx       Ovarian cancer Neg Hx     [4]   Social History  Tobacco Use    Smoking status: Never    Smokeless tobacco: Never   Vaping Use    Vaping status: Never Used   Substance Use Topics    Alcohol use: Never     Comment: rare    Drug use: Never     Comment: Denies   [5]   Current Outpatient Medications:     fluocinonide (LIDEX) 0.05 % external solution, APPLY TOPICALLY TO THE SCALP DAILY AS NEEDED, Disp: , Rfl:     fluticasone (FLONASE) 50 mcg/act nasal spray, 1 spray into each nostril daily, Disp: , Rfl:     LORazepam (ATIVAN) 1 mg tablet, Take 1 tablet (1 mg total) by mouth every 8 (eight) hours as needed (nausea or anxiety), Disp: 20 tablet, Rfl: 0    metoclopramide (REGLAN) 5 mg tablet, Take 1 tablet (5 mg total) by mouth 3 (three) times a day as needed (nausea or vomiting), Disp: 20 tablet, Rfl: 1  [6]   Allergies  Allergen Reactions    Aspirin Other (See Comments)     Avoids ASA due to venous anomaly

## 2025-07-23 NOTE — TELEPHONE ENCOUNTER
New patient phone call made to confirm first chemotherapy appt scheduled at [facility] on Friday, 7/25 at 0900. Patient confirmed appt and made aware of visitor policy, location of appt, and expected length of appt. All questions answered to patient's satisfaction.

## 2025-07-24 RX ORDER — SODIUM CHLORIDE 9 MG/ML
20 INJECTION, SOLUTION INTRAVENOUS ONCE
Status: CANCELLED | OUTPATIENT
Start: 2025-07-25

## 2025-07-24 RX ORDER — PALONOSETRON 0.05 MG/ML
0.25 INJECTION, SOLUTION INTRAVENOUS ONCE
Status: CANCELLED | OUTPATIENT
Start: 2025-07-25

## 2025-07-24 NOTE — PROGRESS NOTES
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 She is s/p TLH/BSO/SLNBx with pathology showing high grade serous carcinoma confined to a polyp, LVSI+, 0/7 LNs involved. She is planned for adjuvant chemotherapy and is seen today for consideration of adjuvant RT.     We reviewed the role of adjuvant RT in the management of patients with stage I endometrial cancers.  In particular, reviewed that for well selected patients adjuvant RT (either in the form of VCBT or pelvic RT) reduces the risk for post-hysterectomy recurrence. Given the patient's high risk histology and LVSI+ I agree that she is sufficiently high risk to warrant treatment.    We discussed the relative advantages and disadvantages of vaginal cuff brachytherapy and pelvic RT.  Between these 2, I would favor vaginal cuff brachytherapy in this patient's case.  Risks and side effects of treatment were reviewed in detail.  These include, but are not limited to, fatigue, dysuria/urinary change, loose stool/diarrhea, vaginal irritation, vaginal stenosis, and vaginal perforation.  We additionally discussed the very low, but nonzero, risk of a more serious complication such as fistula formation or secondary malignancy attributable to radiation.  The patient and her  were given the opportunity ask multiple questions, all of which were answered to her satisfaction.  And informed consent was signed at today's visit.    The patient will proceed with adjuvant chemotherapy and will return to our office following completion for adjuvant VCBT. Final dose and prescription will be determined at the time of RT planning.     Summary:  - Recommend VCBT to  2100-3000cGy/3-5fx (final Rx to be determined at time of RT planning).   - RTC after completion of chemotherapy for RT planning.     Orders:    Ambulatory Referral to Radiation Oncology        History of Present Illness   Chief Complaint   Patient presents with    Consult     Radiation Oncology   Pertinent Medical History   Ms. Evelina Ford is a 64 year old woman who was in her usual state of health until she presented with post-menopausal vaginal spotting. She underwent TVUS demonstrating a thickened heterogenous endometrium measuring 19 mm for endometrial biopsy was recommended.  Endometrial biopsy demonstrated high-grade adenocarcinoma favoring serous carcinoma. The patient underwent total laparoscopic hysterectomy, bilateral salpingo-oophorectomy, bilateral sentinel lymph node mapping (6/19/2025).  Pathology demonstrated high-grade serous carcinoma arising from an endometrial polyp, lymphovascular invasion present, 0/7 lymph nodes involved.    States she did well postoperatively without substantial complication.  She is planned for adjuvant therapy with paclitaxel/carboplatinum.  She was referred to our office for consideration of adjuvant RT.  She is here today with her .  Since surgery she has had no further bleeding.    Oncology History   Cancer Staging   Endometrial carcinoma (HCC)  Staging form: Corpus Uteri - Carcinoma, AJCC 8th Edition  - Clinical: FIGO Stage IA (cT1a, cN0(sn), cM0) - Signed by July Feliz MD on 7/10/2025  Histopathologic type: Serous cystadenocarcinoma, NOS  Method of lymph node assessment: [place] lymph node biopsy  Lymph-vascular invasion (LVI): LVI present/identified, NOS  Omentectomy performed: Yes  Oncology History   Endometrial carcinoma (HCC)   6/2025 Initial Diagnosis    Endometrial carcinoma (HCC)     6/2/2025 Biopsy    A. Polyp, Cervical/Endometrial, Endometrial Polyp:  High grade adenocarcinoma, favor serous carcinoma  Attached / detached  fragments of  endometrial polyp     B. Endometrium, EMC:  Fragments of high grade adenocarcinoma and fragments of endometrial polyp intermixed with blood     2025 Surgery    A. Perineum, left upper quadrant perineum, Biopsy  - Benign fibroadipose tissue.       B. Lymph Node, Right pelvic sentinel lymph node, Excision:  - Two lymph nodes (0/2), negative for carcinoma, supported by CK-AE1/AE3 immunostain.      C. Lymph Node, Left pelvic lymph nodes, Excision:  - Five lymph nodes (0/5), negative for carcinoma, supported by CK-AE1/AE3 immunostain.       D.  Uterus and bilateral ovaries and fallopian tubes, Hysterectomy and bilateral salpingo-oophorectomy:  - High grade serous carcinoma, arising in an endometrial polyp.   * Lymphovascular invasion: Present.   * Pathologic stage (AJCC, 8th ed.): pT1a, pN0(sn), FIGO stage IA1 ([id number]), see synoptic report.  - Extensive adenomyosis.  - Leiomyoma.  - Unremarkable ovaries and fallopian tubes.     E. Omentum, Biopsy:  - Benign adipose tissue.      7/10/2025 -  Cancer Staged    Staging form: Corpus Uteri - Carcinoma, AJCC 8th Edition  - Clinical: FIGO Stage IA (cT1a, cN0(sn), cM0) - Signed by July Feliz MD on 7/10/2025  Histopathologic type: Serous cystadenocarcinoma, NOS  Method of lymph node assessment: [place] lymph node biopsy  Lymph-vascular invasion (LVI): LVI present/identified, NOS  Omentectomy performed: Yes       2025 -  Chemotherapy    CARBOplatin (PARAPLATIN) IVPB (GOG AUC DOSING), , 0 of 6 cycles  PACLItaxel (TAXOL) chemo IVPB, 175 mg/m2 = 325.8 mg, 0 of 6 cycles        Review of Systems Refer to nursing note.         Objective   /78 (BP Location: Left arm)   Pulse 81   Temp 97.8 °F (36.6 °C) (Temporal)   Resp 18   SpO2 99%     Pain Screenin  ECOG ECOG Performance Status: 0 - Fully active, able to carry on all pre-disease performance without restriction  Physical Exam   Well-appearing.  NAD.  No increased work of breathing.  Extremities  warm well-perfused.  Pelvic exam deferred.    Radiology Results: I have reviewed radiology images/reports described above.       Administrative Statements   I have spent a total time of 60 minutes in caring for this patient on the day of the visit/encounter including Diagnostic results, Prognosis, Risks and benefits of tx options, Reviewing/placing orders in the medical record (including tests, medications, and/or procedures), and Obtaining or reviewing history  .

## 2025-07-24 NOTE — ASSESSMENT & PLAN NOTE
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 She is s/p TLH/BSO/SLNBx with pathology showing high grade serous carcinoma confined to a polyp, LVSI+, 0/7 LNs involved. She is planned for adjuvant chemotherapy and is seen today for consideration of adjuvant RT.     We reviewed the role of adjuvant RT in the management of patients with stage I endometrial cancers.  In particular, reviewed that for well selected patients adjuvant RT (either in the form of VCBT or pelvic RT) reduces the risk for post-hysterectomy recurrence. Given the patient's high risk histology and LVSI+ I agree that she is sufficiently high risk to warrant treatment.    We discussed the relative advantages and disadvantages of vaginal cuff brachytherapy and pelvic RT.  Between these 2, I would favor vaginal cuff brachytherapy in this patient's case.  Risks and side effects of treatment were reviewed in detail.  These include, but are not limited to, fatigue, dysuria/urinary change, loose stool/diarrhea, vaginal irritation, vaginal stenosis, and vaginal perforation.  We additionally discussed the very low, but nonzero, risk of a more serious complication such as fistula formation or secondary malignancy attributable to radiation.  The patient and her  were given the opportunity ask multiple questions, all of which were answered to her satisfaction.  And informed consent was signed at today's visit.    The patient will proceed with adjuvant chemotherapy and will return to our office following completion for adjuvant VCBT. Final dose and prescription will be determined at the time of RT planning.     Summary:  - Recommend VCBT to 2100-3000cGy/3-5fx (final Rx to be determined at time of RT planning).   - RTC after completion of chemotherapy for RT planning.     Orders:    Ambulatory Referral to Radiation Oncology

## 2025-07-25 ENCOUNTER — HOSPITAL ENCOUNTER (OUTPATIENT)
Dept: INFUSION CENTER | Facility: CLINIC | Age: 65
End: 2025-07-25
Attending: OBSTETRICS & GYNECOLOGY
Payer: COMMERCIAL

## 2025-07-25 VITALS
BODY MASS INDEX: 36.06 KG/M2 | HEART RATE: 82 BPM | HEIGHT: 61 IN | DIASTOLIC BLOOD PRESSURE: 78 MMHG | SYSTOLIC BLOOD PRESSURE: 132 MMHG | OXYGEN SATURATION: 98 % | RESPIRATION RATE: 18 BRPM | WEIGHT: 191 LBS | TEMPERATURE: 96.8 F

## 2025-07-25 DIAGNOSIS — C54.1 ENDOMETRIAL CARCINOMA (HCC): Primary | ICD-10-CM

## 2025-07-25 PROCEDURE — 96415 CHEMO IV INFUSION ADDL HR: CPT

## 2025-07-25 PROCEDURE — 96417 CHEMO IV INFUS EACH ADDL SEQ: CPT

## 2025-07-25 PROCEDURE — 96375 TX/PRO/DX INJ NEW DRUG ADDON: CPT

## 2025-07-25 PROCEDURE — 96413 CHEMO IV INFUSION 1 HR: CPT

## 2025-07-25 PROCEDURE — 96367 TX/PROPH/DG ADDL SEQ IV INF: CPT

## 2025-07-25 RX ORDER — SODIUM CHLORIDE 9 MG/ML
20 INJECTION, SOLUTION INTRAVENOUS ONCE
Status: COMPLETED | OUTPATIENT
Start: 2025-07-25 | End: 2025-07-25

## 2025-07-25 RX ORDER — PALONOSETRON 0.05 MG/ML
0.25 INJECTION, SOLUTION INTRAVENOUS ONCE
Status: COMPLETED | OUTPATIENT
Start: 2025-07-25 | End: 2025-07-25

## 2025-07-25 RX ADMIN — DEXAMETHASONE SODIUM PHOSPHATE 20 MG: 10 INJECTION, SOLUTION INTRAMUSCULAR; INTRAVENOUS at 10:42

## 2025-07-25 RX ADMIN — DIPHENHYDRAMINE HYDROCHLORIDE 25 MG: 50 INJECTION, SOLUTION INTRAMUSCULAR; INTRAVENOUS at 10:23

## 2025-07-25 RX ADMIN — FOSAPREPITANT 150 MG: 150 INJECTION, POWDER, LYOPHILIZED, FOR SOLUTION INTRAVENOUS at 09:26

## 2025-07-25 RX ADMIN — CARBOPLATIN 601.2 MG: 10 INJECTION INTRAVENOUS at 14:42

## 2025-07-25 RX ADMIN — PACLITAXEL 325.8 MG: 6 INJECTION, SOLUTION INTRAVENOUS at 11:21

## 2025-07-25 RX ADMIN — SODIUM CHLORIDE 20 ML/HR: 0.9 INJECTION, SOLUTION INTRAVENOUS at 09:25

## 2025-07-25 RX ADMIN — FAMOTIDINE 20 MG: 10 INJECTION INTRAVENOUS at 10:00

## 2025-07-25 RX ADMIN — PALONOSETRON HYDROCHLORIDE 0.25 MG: 0.25 INJECTION, SOLUTION INTRAVENOUS at 11:13

## 2025-07-25 NOTE — PROGRESS NOTES
Patient to infusion for D1C1 Taxol/Carbo.  She offers no complaints.  Labs reviewed from 7/22, meets parameters.  She tolerated she tolerated treatment today without adverse reaction.  Next appointment confirmed 8/15 0800, she declined AVS

## 2025-07-30 ENCOUNTER — APPOINTMENT (OUTPATIENT)
Dept: LAB | Facility: CLINIC | Age: 65
End: 2025-07-30
Attending: PHYSICIAN ASSISTANT
Payer: COMMERCIAL

## 2025-07-30 DIAGNOSIS — C54.1 ENDOMETRIAL CARCINOMA (HCC): ICD-10-CM

## 2025-07-30 LAB
ALBUMIN SERPL BCG-MCNC: 4.4 G/DL (ref 3.5–5)
ALP SERPL-CCNC: 66 U/L (ref 34–104)
ALT SERPL W P-5'-P-CCNC: 17 U/L (ref 7–52)
ANION GAP SERPL CALCULATED.3IONS-SCNC: 7 MMOL/L (ref 4–13)
AST SERPL W P-5'-P-CCNC: 13 U/L (ref 13–39)
BASOPHILS # BLD AUTO: 0.02 THOUSANDS/ÂΜL (ref 0–0.1)
BASOPHILS NFR BLD AUTO: 0 % (ref 0–1)
BILIRUB SERPL-MCNC: 0.87 MG/DL (ref 0.2–1)
BUN SERPL-MCNC: 17 MG/DL (ref 5–25)
CALCIUM SERPL-MCNC: 9.3 MG/DL (ref 8.4–10.2)
CHLORIDE SERPL-SCNC: 101 MMOL/L (ref 96–108)
CO2 SERPL-SCNC: 28 MMOL/L (ref 21–32)
CREAT SERPL-MCNC: 0.81 MG/DL (ref 0.6–1.3)
EOSINOPHIL # BLD AUTO: 0.13 THOUSAND/ÂΜL (ref 0–0.61)
EOSINOPHIL NFR BLD AUTO: 3 % (ref 0–6)
ERYTHROCYTE [DISTWIDTH] IN BLOOD BY AUTOMATED COUNT: 12.7 % (ref 11.6–15.1)
GFR SERPL CREATININE-BSD FRML MDRD: 77 ML/MIN/1.73SQ M
GLUCOSE P FAST SERPL-MCNC: 162 MG/DL (ref 65–99)
HCT VFR BLD AUTO: 39.9 % (ref 34.8–46.1)
HGB BLD-MCNC: 13.1 G/DL (ref 11.5–15.4)
IMM GRANULOCYTES # BLD AUTO: 0.01 THOUSAND/UL (ref 0–0.2)
IMM GRANULOCYTES NFR BLD AUTO: 0 % (ref 0–2)
LYMPHOCYTES # BLD AUTO: 2.05 THOUSANDS/ÂΜL (ref 0.6–4.47)
LYMPHOCYTES NFR BLD AUTO: 44 % (ref 14–44)
MAGNESIUM SERPL-MCNC: 2 MG/DL (ref 1.9–2.7)
MCH RBC QN AUTO: 29.4 PG (ref 26.8–34.3)
MCHC RBC AUTO-ENTMCNC: 32.8 G/DL (ref 31.4–37.4)
MCV RBC AUTO: 90 FL (ref 82–98)
MONOCYTES # BLD AUTO: 0.12 THOUSAND/ÂΜL (ref 0.17–1.22)
MONOCYTES NFR BLD AUTO: 3 % (ref 4–12)
NEUTROPHILS # BLD AUTO: 2.37 THOUSANDS/ÂΜL (ref 1.85–7.62)
NEUTS SEG NFR BLD AUTO: 50 % (ref 43–75)
NRBC BLD AUTO-RTO: 0 /100 WBCS
PLATELET # BLD AUTO: 216 THOUSANDS/UL (ref 149–390)
PMV BLD AUTO: 10.4 FL (ref 8.9–12.7)
POTASSIUM SERPL-SCNC: 4.1 MMOL/L (ref 3.5–5.3)
PROT SERPL-MCNC: 7 G/DL (ref 6.4–8.4)
RBC # BLD AUTO: 4.45 MILLION/UL (ref 3.81–5.12)
SODIUM SERPL-SCNC: 136 MMOL/L (ref 135–147)
WBC # BLD AUTO: 4.7 THOUSAND/UL (ref 4.31–10.16)

## 2025-07-30 PROCEDURE — 36415 COLL VENOUS BLD VENIPUNCTURE: CPT

## 2025-07-30 PROCEDURE — 83735 ASSAY OF MAGNESIUM: CPT

## 2025-07-30 PROCEDURE — 85025 COMPLETE CBC W/AUTO DIFF WBC: CPT

## 2025-07-30 PROCEDURE — 80053 COMPREHEN METABOLIC PANEL: CPT

## 2025-08-08 ENCOUNTER — TELEMEDICINE (OUTPATIENT)
Dept: GYNECOLOGIC ONCOLOGY | Facility: CLINIC | Age: 65
End: 2025-08-08
Payer: COMMERCIAL

## 2025-08-08 ENCOUNTER — TELEPHONE (OUTPATIENT)
Dept: GYNECOLOGIC ONCOLOGY | Facility: CLINIC | Age: 65
End: 2025-08-08

## 2025-08-08 DIAGNOSIS — Z90.721 S/P HYSTERECTOMY WITH OOPHORECTOMY: ICD-10-CM

## 2025-08-08 DIAGNOSIS — C54.1 ENDOMETRIAL CARCINOMA (HCC): Primary | ICD-10-CM

## 2025-08-08 DIAGNOSIS — Z90.710 S/P HYSTERECTOMY WITH OOPHORECTOMY: ICD-10-CM

## 2025-08-08 PROCEDURE — 98005 SYNCH AUDIO-VIDEO EST LOW 20: CPT | Performed by: OBSTETRICS & GYNECOLOGY

## 2025-08-14 ENCOUNTER — TELEPHONE (OUTPATIENT)
Age: 65
End: 2025-08-14

## 2025-08-14 ENCOUNTER — TELEPHONE (OUTPATIENT)
Dept: INFUSION CENTER | Facility: CLINIC | Age: 65
End: 2025-08-14

## 2025-08-14 ENCOUNTER — TELEPHONE (OUTPATIENT)
Dept: GYNECOLOGIC ONCOLOGY | Facility: CLINIC | Age: 65
End: 2025-08-14

## 2025-08-15 ENCOUNTER — HOSPITAL ENCOUNTER (OUTPATIENT)
Dept: INFUSION CENTER | Facility: CLINIC | Age: 65
End: 2025-08-15
Attending: OBSTETRICS & GYNECOLOGY
Payer: COMMERCIAL

## 2025-08-20 ENCOUNTER — PATIENT OUTREACH (OUTPATIENT)
Dept: HEMATOLOGY ONCOLOGY | Facility: CLINIC | Age: 65
End: 2025-08-20

## (undated) DEVICE — MONOPOLAR CURVED SCISSORS: Brand: ENDOWRIST

## (undated) DEVICE — SEAL

## (undated) DEVICE — Device

## (undated) DEVICE — TISSUE REMOVAL SYSTEM RESECTING DEVICE: Brand: SYMPHION

## (undated) DEVICE — TISSUE REMOVAL SYSTEM FLUID MANAGEMENT ACCESSORIES: Brand: SYMPHION

## (undated) DEVICE — CHLORHEXIDINE 4PCT 4 OZ

## (undated) DEVICE — REM POLYHESIVE ADULT PATIENT RETURN ELECTRODE: Brand: VALLEYLAB

## (undated) DEVICE — KIT, BETHLEHEM ROBOTIC PROST: Brand: CARDINAL HEALTH

## (undated) DEVICE — BLADELESS OBTURATOR: Brand: WECK VISTA

## (undated) DEVICE — TIP COVER ACCESSORY

## (undated) DEVICE — VISUALIZATION SYSTEM: Brand: CLEARIFY

## (undated) DEVICE — MAYO STAND COVER: Brand: CONVERTORS

## (undated) DEVICE — ASTOUND STANDARD SURGICAL GOWN, XL: Brand: CONVERTORS

## (undated) DEVICE — SUT STRATAFIX SPIRAL 2-0 CT-1 30 CM SXPP1B410

## (undated) DEVICE — EXOFIN PRECISION PEN HIGH VISCOSITY TOPICAL SKIN ADHESIVE: Brand: EXOFIN PRECISION PEN, 1G

## (undated) DEVICE — BETHLEHEM UNIVERSAL MINOR VAG: Brand: CARDINAL HEALTH

## (undated) DEVICE — FENESTRATED BIPOLAR FORCEPS: Brand: ENDOWRIST

## (undated) DEVICE — LAPAROTOMY SPONGE - RF AND X-RAY DETECTABLE PRE-WASHED: Brand: SITUATE

## (undated) DEVICE — ELECTRO LUBE IS A SINGLE PATIENT USE DEVICE THAT IS INTENDED TO BE USED ON ELECTROSURGICAL ELECTRODES TO REDUCE STICKING.: Brand: KEY SURGICAL ELECTRO LUBE

## (undated) DEVICE — COLUMN DRAPE

## (undated) DEVICE — PREMIUM DRY TRAY LF: Brand: MEDLINE INDUSTRIES, INC.

## (undated) DEVICE — 40595 XL TRENDELENBURG POSITIONING KIT: Brand: 40595 XL TRENDELENBURG POSITIONING KIT

## (undated) DEVICE — TROCAR PORT ACCESS 8 X100MML W BLDLS OPTICAL TIP AIRSEAL

## (undated) DEVICE — INTENDED FOR TISSUE SEPARATION, AND OTHER PROCEDURES THAT REQUIRE A SHARP SURGICAL BLADE TO PUNCTURE OR CUT.: Brand: BARD-PARKER SAFETY BLADES SIZE 11, STERILE

## (undated) DEVICE — GLOVE PI ULTRA TOUCH SZ.7.0

## (undated) DEVICE — PROGRASP FORCEPS: Brand: ENDOWRIST

## (undated) DEVICE — TISSUE RETRIEVAL SYSTEM: Brand: INZII RETRIEVAL SYSTEM

## (undated) DEVICE — PVC URETHRAL CATHETER: Brand: DOVER

## (undated) DEVICE — MEGA SUTURECUT ND: Brand: ENDOWRIST

## (undated) DEVICE — TRAP,MUCUS SPECIMEN, 80CC: Brand: MEDLINE

## (undated) DEVICE — TROCAR: Brand: KII FIOS FIRST ENTRY

## (undated) DEVICE — GLOVE INDICATOR PI UNDERGLOVE SZ 7.5 BLUE

## (undated) DEVICE — SURGICEL 4 X 8IN

## (undated) DEVICE — ARM DRAPE

## (undated) DEVICE — VCARE MEDIUM, UTERINE MANIPULATOR, VAGINAL-CERVICAL-AHLUWALIA'S-RETRACTOR-ELEVATOR: Brand: VCARE

## (undated) DEVICE — NEEDLE COUNTER, 1820 FOAM BLOCK: Brand: DEVON